# Patient Record
Sex: FEMALE | Race: WHITE | NOT HISPANIC OR LATINO | Employment: UNEMPLOYED | URBAN - METROPOLITAN AREA
[De-identification: names, ages, dates, MRNs, and addresses within clinical notes are randomized per-mention and may not be internally consistent; named-entity substitution may affect disease eponyms.]

---

## 2017-03-06 ENCOUNTER — PROCEDURE VISIT (OUTPATIENT)
Dept: OBSTETRICS AND GYNECOLOGY | Facility: CLINIC | Age: 22
End: 2017-03-06

## 2017-03-06 ENCOUNTER — OFFICE VISIT (OUTPATIENT)
Dept: OBSTETRICS AND GYNECOLOGY | Facility: CLINIC | Age: 22
End: 2017-03-06

## 2017-03-06 VITALS
HEIGHT: 66 IN | BODY MASS INDEX: 17.36 KG/M2 | WEIGHT: 108 LBS | SYSTOLIC BLOOD PRESSURE: 100 MMHG | DIASTOLIC BLOOD PRESSURE: 62 MMHG

## 2017-03-06 DIAGNOSIS — Z36.9 ANTENATAL SCREENING ENCOUNTER: ICD-10-CM

## 2017-03-06 DIAGNOSIS — Z34.01 NORMAL FIRST PREGNANCY WITH UNCERTAIN DATE OF LMP, ANTEPARTUM, FIRST TRIMESTER: ICD-10-CM

## 2017-03-06 DIAGNOSIS — Z87.898 HISTORY OF SUBSTANCE USE: ICD-10-CM

## 2017-03-06 DIAGNOSIS — Z32.00 ENCOUNTER FOR CONFIRMATION OF PREGNANCY TEST RESULT WITH PHYSICAL EXAMINATION: Primary | ICD-10-CM

## 2017-03-06 DIAGNOSIS — Z13.9 SCREENING: ICD-10-CM

## 2017-03-06 LAB
BILIRUB BLD-MCNC: NEGATIVE MG/DL
CBC, PLATELET CT, AND DIFF: (no result)
CLARITY, POC: CLEAR
COLOR UR: YELLOW
GLUCOSE UR STRIP-MCNC: NEGATIVE MG/DL
KETONES UR QL: NEGATIVE
LEUKOCYTE EST, POC: NEGATIVE
NITRITE UR-MCNC: NEGATIVE MG/ML
PH UR: 6.5 [PH] (ref 5–8)
PROT UR STRIP-MCNC: NEGATIVE MG/DL
RBC # UR STRIP: NEGATIVE /UL
SP GR UR: 1.03 (ref 1–1.03)
UROBILINOGEN UR QL: NORMAL

## 2017-03-06 PROCEDURE — 76801 OB US < 14 WKS SINGLE FETUS: CPT | Performed by: OBSTETRICS & GYNECOLOGY

## 2017-03-06 PROCEDURE — 81002 URINALYSIS NONAUTO W/O SCOPE: CPT | Performed by: OBSTETRICS & GYNECOLOGY

## 2017-03-06 PROCEDURE — 99385 PREV VISIT NEW AGE 18-39: CPT | Performed by: OBSTETRICS & GYNECOLOGY

## 2017-03-06 RX ORDER — VITAMIN A ACETATE, .BETA.-CAROTENE, ASCORBIC ACID, CHOLECALCIFEROL, .ALPHA.-TOCOPHEROL ACETATE, DL-, THIAMINE MONONITRATE, RIBOFLAVIN, NIACINAMIDE, PYRIDOXINE HYDROCHLORIDE, FOLIC ACID, CYANOCOBALAMIN, CALCIUM CARBONATE, FERROUS FUMARATE, ZINC OXIDE, AND CUPRIC OXIDE 2000; 2000; 120; 400; 22; 1.84; 3; 20; 10; 1; 12; 200; 27; 25; 2 [IU]/1; [IU]/1; MG/1; [IU]/1; MG/1; MG/1; MG/1; MG/1; MG/1; MG/1; UG/1; MG/1; MG/1; MG/1; MG/1
TABLET ORAL
Refills: 0 | COMMUNITY
Start: 2017-02-14

## 2017-03-06 NOTE — PROGRESS NOTES
"Subjective   Annabella Padgett is a 21 y.o. female who presents as a new patient for confirmation of pregnancy  pt states thinks she has a yeast infection/states has a lot of vaginal discharge/was told she had a UTI when she went to the hospital/has since taken antibiotics and is feeling better- TVUS at [place] on on 2/14/17 revealed viable IUP at 8-1 weeks c/w LMP with VIRGIE of 9/21/17 - GC/CT negative at ER on 2/14/17 - desires informaseq  History of Present Illness    The following portions of the patient's history were reviewed and updated as appropriate: allergies, current medications, past family history, past medical history, past social history, past surgical history and problem list.    Review of Systems   Constitutional: Negative for chills, fatigue and fever.   Gastrointestinal: Negative for abdominal distention and abdominal pain.   Genitourinary: Negative for difficulty urinating, dysuria, menstrual problem, pelvic pain, vaginal bleeding, vaginal discharge and vaginal pain.   All other systems reviewed and are negative.    Visit Vitals   • /62   • Ht 66\" (167.6 cm)   • Wt 108 lb (49 kg)   • LMP 12/15/2016   • Breastfeeding No   • BMI 17.43 kg/m2         Objective   Physical Exam   Constitutional: She is oriented to person, place, and time. She appears well-developed and well-nourished.   Neck: Normal range of motion. Neck supple. No thyromegaly present.   Cardiovascular: Normal rate and regular rhythm.    Pulmonary/Chest: Effort normal and breath sounds normal.   Abdominal: Soft. Bowel sounds are normal. She exhibits no distension. There is no tenderness.   Genitourinary: Pelvic exam was performed with patient supine.   Musculoskeletal: Normal range of motion. She exhibits no edema.   Neurological: She is alert and oriented to person, place, and time.   Skin: Skin is warm and dry. No rash noted.   Psychiatric: She has a normal mood and affect. Her behavior is normal.   Nursing note and vitals " reviewed.    Fetal heart tones - 171    Assessment/Plan   Annabella was seen today for establish care and possible pregnancy.    Diagnoses and all orders for this visit:    Encounter for confirmation of pregnancy test result with physical examination  -     Varicella Zoster Antibody, IgG  -     Urine Culture  -     Rubella Antibody, IgG  -     RPR  -     HIV-1 / O / 2 Ag / Antibody 4th Generation  -     Hepatitis C Antibody  -     Hepatitis B Surface Antigen  -     Hgb. Frac. Profile  -     CBC & Differential  -     Antibody Screen  -     ABO / Rh    Screening  -     POC Urinalysis Dipstick  -     Cancel: POC Pregnancy, Urine     screening encounter  -     InformaSeq([facility]) With Y Analysis    History of substance use    3 weeks for ob intake

## 2017-03-10 ENCOUNTER — TELEPHONE (OUTPATIENT)
Dept: OBSTETRICS AND GYNECOLOGY | Facility: CLINIC | Age: 22
End: 2017-03-10

## 2017-03-12 LAB
# FETUSES US: 1
ABO GROUP BLD: NORMAL
BACTERIA UR CULT: NO GROWTH
BACTERIA UR CULT: NORMAL
BASOPHILS # BLD AUTO: 0 X10E3/UL (ref 0–0.2)
BASOPHILS NFR BLD AUTO: 0 %
BLD GP AB SCN SERPL QL: NEGATIVE
CFDNA.FET/CFDNA.TOTAL SFR FETUS: 13.1 %
CHR X + Y ANEUP PLAS.CFDNA: NORMAL
CHROM BREAK BLD DEB: NORMAL
CHROMOSOME 18 RESULT (REFERENCE): NORMAL
CYTOGENETICS STUDY: NORMAL
EOSINOPHIL # BLD AUTO: 0.4 X10E3/UL (ref 0–0.4)
EOSINOPHIL NFR BLD AUTO: 3 %
ERYTHROCYTE [DISTWIDTH] IN BLOOD BY AUTOMATED COUNT: 13.5 % (ref 12.3–15.4)
FET 13+18+21+X+Y ANEUP PLAS.CFDNA: NORMAL
FET CHR 13 TS PLAS.CFDNA QL: NORMAL
FET CHR 18 TS PLAS.CFDNA QL: NORMAL
FET CHR 21 TS PLAS.CFDNA QL: NORMAL
FET CHR 21 TS PLAS.CFDNA QL: NORMAL
GA: 11.6 WEEKS
GENETIC ALGORITHM SENSITIVITY: NORMAL %
HBV SURFACE AG SERPL QL IA: NEGATIVE
HCT VFR BLD AUTO: 38 % (ref 34–46.6)
HCV AB S/CO SERPL IA: <0.1 S/CO RATIO (ref 0–0.9)
HGB A MFR BLD: 97.3 % (ref 94–98)
HGB A2 MFR BLD COLUMN CHROM: 2.7 % (ref 0.7–3.1)
HGB BLD-MCNC: 12.9 G/DL (ref 11.1–15.9)
HGB C MFR BLD: 0 %
HGB F MFR BLD: 0 % (ref 0–2)
HGB FRACT BLD-IMP: NORMAL
HGB S BLD QL SOLY: NEGATIVE
HGB S MFR BLD: 0 %
HIV 1+2 AB+HIV1 P24 AG SERPL QL IA: NON REACTIVE
IMM GRANULOCYTES # BLD: 0 X10E3/UL (ref 0–0.1)
IMM GRANULOCYTES NFR BLD: 0 %
LAB DIRECTOR NAME PROVIDER: NORMAL
LYMPHOCYTES # BLD AUTO: 2.3 X10E3/UL (ref 0.7–3.1)
LYMPHOCYTES NFR BLD AUTO: 19 %
Lab: NORMAL
MCH RBC QN AUTO: 30.4 PG (ref 26.6–33)
MCHC RBC AUTO-ENTMCNC: 33.9 G/DL (ref 31.5–35.7)
MCV RBC AUTO: 90 FL (ref 79–97)
MONOCYTES # BLD AUTO: 0.6 X10E3/UL (ref 0.1–0.9)
MONOCYTES NFR BLD AUTO: 5 %
NEUTROPHILS # BLD AUTO: 8.7 X10E3/UL (ref 1.4–7)
NEUTROPHILS NFR BLD AUTO: 73 %
PLATELET # BLD AUTO: 328 X10E3/UL (ref 150–379)
RBC # BLD AUTO: 4.24 X10E6/UL (ref 3.77–5.28)
REASON FOR REFERRAL (NARRATIVE): NORMAL
REF LAB TEST METHOD: NORMAL
REFERENCE: NORMAL
RH BLD: POSITIVE
RPR SER QL: NON REACTIVE
RUBV IGG SERPL IA-ACNC: 3.41 INDEX
SERVICE CMNT 02-IMP: NORMAL
SERVICE CMNT-IMP: NORMAL
SEX CHROMOSOME INTERPRETATION: NORMAL
VZV IGG SER IA-ACNC: 1415 INDEX
WBC # BLD AUTO: 12.1 X10E3/UL (ref 3.4–10.8)

## 2017-03-13 ENCOUNTER — TELEPHONE (OUTPATIENT)
Dept: OBSTETRICS AND GYNECOLOGY | Facility: CLINIC | Age: 22
End: 2017-03-13

## 2017-03-13 LAB
AMPHETAMINES UR QL SCN: NEGATIVE NG/ML
BARBITURATES UR QL SCN: NEGATIVE NG/ML
BENZODIAZ UR QL: POSITIVE
BZE UR QL: NEGATIVE NG/ML
CANNABINOIDS UR QL SCN: POSITIVE
METHADONE UR QL SCN: NEGATIVE NG/ML
OPIATES UR QL: NEGATIVE NG/ML
PCP UR QL: NEGATIVE NG/ML
PROPOXYPH UR QL: NEGATIVE NG/ML

## 2017-03-13 NOTE — TELEPHONE ENCOUNTER
----- Message from Caridad Hedrick MD sent at 3/13/2017  8:57 AM EDT -----  Please call patient and notify of normal results of prenatal labs and testing for aneuploidy negative and it's a boy!!

## 2017-03-13 NOTE — PROGRESS NOTES
Please call patient and notify of normal results of prenatal labs and testing for aneuploidy negative and it's a boy!!

## 2017-03-14 NOTE — TELEPHONE ENCOUNTER
Called and sw man who said that he would let pt know I called and would have her call me back when he got home. I stated understanding.

## 2017-03-21 ENCOUNTER — TELEPHONE (OUTPATIENT)
Dept: OBSTETRICS AND GYNECOLOGY | Facility: CLINIC | Age: 22
End: 2017-03-21

## 2017-03-21 RX ORDER — FLUCONAZOLE 150 MG/1
150 TABLET ORAL DAILY
Qty: 1 TABLET | Refills: 0 | Status: SHIPPED | OUTPATIENT
Start: 2017-03-21 | End: 2017-05-10

## 2017-03-21 NOTE — TELEPHONE ENCOUNTER
----- Message from Shefali Hayes sent at 3/21/2017 12:32 PM EDT -----  Contact: pt  Pt called asking for an RX for Monistat to be called in for her because passport will pay for it. PROMISEM

## 2017-03-22 NOTE — TELEPHONE ENCOUNTER
Called number listed and sw man who gave me another number to call pt at. Called this number ([phone]) and it continued to ring and I could not leave and message.

## 2017-03-24 NOTE — TELEPHONE ENCOUNTER
Called number given to me on 3/22/17 and it still continued to ring and I could not leave a message.

## 2017-04-07 ENCOUNTER — INITIAL PRENATAL (OUTPATIENT)
Dept: OBSTETRICS AND GYNECOLOGY | Facility: CLINIC | Age: 22
End: 2017-04-07

## 2017-04-07 VITALS — BODY MASS INDEX: 18.56 KG/M2 | WEIGHT: 115 LBS | DIASTOLIC BLOOD PRESSURE: 60 MMHG | SYSTOLIC BLOOD PRESSURE: 102 MMHG

## 2017-04-07 DIAGNOSIS — F13.10 BENZODIAZEPINE ABUSE (HCC): ICD-10-CM

## 2017-04-07 DIAGNOSIS — Z34.82 PRENATAL CARE, SUBSEQUENT PREGNANCY, SECOND TRIMESTER: Primary | ICD-10-CM

## 2017-04-07 DIAGNOSIS — Z13.9 SCREENING: ICD-10-CM

## 2017-04-07 DIAGNOSIS — F12.90 MARIJUANA USE: ICD-10-CM

## 2017-04-07 DIAGNOSIS — Z87.898 HISTORY OF SUBSTANCE USE: ICD-10-CM

## 2017-04-07 DIAGNOSIS — O99.332 TOBACCO USE COMPLICATING PREGNANCY, SECOND TRIMESTER: ICD-10-CM

## 2017-04-07 PROBLEM — O99.330 TOBACCO USE COMPLICATING PREGNANCY: Status: ACTIVE | Noted: 2017-04-07

## 2017-04-07 LAB
BILIRUB BLD-MCNC: ABNORMAL MG/DL
CLARITY, POC: CLEAR
COLOR UR: YELLOW
GLUCOSE UR STRIP-MCNC: NEGATIVE MG/DL
KETONES UR QL: ABNORMAL
LEUKOCYTE EST, POC: NEGATIVE
NITRITE UR-MCNC: NEGATIVE MG/ML
PH UR: 6 [PH] (ref 5–8)
PROT UR STRIP-MCNC: ABNORMAL MG/DL
RBC # UR STRIP: NEGATIVE /UL
SP GR UR: 1.03 (ref 1–1.03)
UROBILINOGEN UR QL: NORMAL

## 2017-04-07 PROCEDURE — 99213 OFFICE O/P EST LOW 20 MIN: CPT | Performed by: OBSTETRICS & GYNECOLOGY

## 2017-04-07 PROCEDURE — 99406 BEHAV CHNG SMOKING 3-10 MIN: CPT | Performed by: OBSTETRICS & GYNECOLOGY

## 2017-04-07 PROCEDURE — 81002 URINALYSIS NONAUTO W/O SCOPE: CPT | Performed by: OBSTETRICS & GYNECOLOGY

## 2017-04-07 NOTE — PROGRESS NOTES
HPI: 21 y.o.  at 16w1d presents for ob intake.  No c/o    [x]  Vitals reviewed    ROS:  GI:  Negative  Pulmonary: Negative     A/P  1. Intrauterine pregnancy at 16w1d     Assessment/Plan   Patient Active Problem List   Diagnosis Code   • History of substance use Z87.898   • Benzodiazepine abuse F13.10   • Marijuana use F12.10       PLAN:   Benzo/MJ use- recheck throughout prenatal care - encourage cessation   OB intake done   Prenatal labs reviewed   Informaseq negative   Declines  AFP only   4 weeks anatomy US   Tobacco use - I advised the patient of the risks in continuing to use tobacco, and I provided this patient with smoking cessation educational materials.  I also discussed how to quit smoking and the patient has expressed the willingness to quit.      During this visit, I spent 3-10 mintues counseling the patient regarding smoking cessation.

## 2017-05-10 ENCOUNTER — PROCEDURE VISIT (OUTPATIENT)
Dept: OBSTETRICS AND GYNECOLOGY | Facility: CLINIC | Age: 22
End: 2017-05-10

## 2017-05-10 ENCOUNTER — ROUTINE PRENATAL (OUTPATIENT)
Dept: OBSTETRICS AND GYNECOLOGY | Facility: CLINIC | Age: 22
End: 2017-05-10

## 2017-05-10 VITALS — BODY MASS INDEX: 18.56 KG/M2 | WEIGHT: 115 LBS | DIASTOLIC BLOOD PRESSURE: 68 MMHG | SYSTOLIC BLOOD PRESSURE: 110 MMHG

## 2017-05-10 DIAGNOSIS — IMO0002 EVALUATE ANATOMY NOT SEEN ON PRIOR SONOGRAM: ICD-10-CM

## 2017-05-10 DIAGNOSIS — Z34.82 PRENATAL CARE, SUBSEQUENT PREGNANCY, SECOND TRIMESTER: Primary | ICD-10-CM

## 2017-05-10 DIAGNOSIS — O99.332 TOBACCO USE COMPLICATING PREGNANCY, SECOND TRIMESTER: ICD-10-CM

## 2017-05-10 DIAGNOSIS — F19.11 HISTORY OF SUBSTANCE ABUSE (HCC): ICD-10-CM

## 2017-05-10 DIAGNOSIS — Z87.898 HISTORY OF SUBSTANCE USE: ICD-10-CM

## 2017-05-10 DIAGNOSIS — Z36.89 ENCOUNTER FOR FETAL ANATOMIC SURVEY: Primary | ICD-10-CM

## 2017-05-10 DIAGNOSIS — Z13.9 SCREENING: ICD-10-CM

## 2017-05-10 LAB
BILIRUB BLD-MCNC: NEGATIVE MG/DL
CLARITY, POC: CLEAR
COLOR UR: YELLOW
GLUCOSE UR STRIP-MCNC: NEGATIVE MG/DL
KETONES UR QL: NEGATIVE
LEUKOCYTE EST, POC: NEGATIVE
NITRITE UR-MCNC: NEGATIVE MG/ML
PH UR: 7.5 [PH] (ref 5–8)
PROT UR STRIP-MCNC: ABNORMAL MG/DL
RBC # UR STRIP: NEGATIVE /UL
SP GR UR: 1.02 (ref 1–1.03)
UROBILINOGEN UR QL: NORMAL

## 2017-05-10 PROCEDURE — 99213 OFFICE O/P EST LOW 20 MIN: CPT | Performed by: OBSTETRICS & GYNECOLOGY

## 2017-05-10 PROCEDURE — 81002 URINALYSIS NONAUTO W/O SCOPE: CPT | Performed by: OBSTETRICS & GYNECOLOGY

## 2017-05-10 PROCEDURE — 76805 OB US >/= 14 WKS SNGL FETUS: CPT | Performed by: OBSTETRICS & GYNECOLOGY

## 2017-05-13 LAB
A VAGINAE DNA VAG QL NAA+PROBE: NORMAL SCORE
BVAB2 DNA VAG QL NAA+PROBE: NORMAL SCORE
C ALBICANS DNA VAG QL NAA+PROBE: NEGATIVE
C GLABRATA DNA VAG QL NAA+PROBE: NEGATIVE
C TRACH RRNA SPEC QL NAA+PROBE: NEGATIVE
MEGA1 DNA VAG QL NAA+PROBE: NORMAL SCORE
N GONORRHOEA RRNA SPEC QL NAA+PROBE: NEGATIVE
T VAGINALIS RRNA SPEC QL NAA+PROBE: NEGATIVE

## 2017-05-15 ENCOUNTER — TELEPHONE (OUTPATIENT)
Dept: OBSTETRICS AND GYNECOLOGY | Facility: CLINIC | Age: 22
End: 2017-05-15

## 2017-05-17 LAB
AMPHETAMINES UR QL SCN: NEGATIVE NG/ML
BARBITURATES UR QL SCN: NEGATIVE NG/ML
BENZODIAZ UR QL: NEGATIVE
BZE UR QL: NEGATIVE NG/ML
CANNABINOIDS UR QL SCN: POSITIVE
METHADONE UR QL SCN: NEGATIVE NG/ML
OPIATES UR QL: NEGATIVE NG/ML
PCP UR QL: NEGATIVE NG/ML
PROPOXYPH UR QL: NEGATIVE NG/ML

## 2017-06-06 ENCOUNTER — ROUTINE PRENATAL (OUTPATIENT)
Dept: OBSTETRICS AND GYNECOLOGY | Facility: CLINIC | Age: 22
End: 2017-06-06

## 2017-06-06 ENCOUNTER — PROCEDURE VISIT (OUTPATIENT)
Dept: OBSTETRICS AND GYNECOLOGY | Facility: CLINIC | Age: 22
End: 2017-06-06

## 2017-06-06 VITALS — WEIGHT: 119 LBS | DIASTOLIC BLOOD PRESSURE: 60 MMHG | SYSTOLIC BLOOD PRESSURE: 100 MMHG | BODY MASS INDEX: 19.21 KG/M2

## 2017-06-06 DIAGNOSIS — Z87.898 HISTORY OF SUBSTANCE USE: ICD-10-CM

## 2017-06-06 DIAGNOSIS — IMO0002 EVALUATE ANATOMY NOT SEEN ON PRIOR SONOGRAM: ICD-10-CM

## 2017-06-06 DIAGNOSIS — Z13.9 SCREENING: ICD-10-CM

## 2017-06-06 DIAGNOSIS — O99.332 TOBACCO USE COMPLICATING PREGNANCY, SECOND TRIMESTER: ICD-10-CM

## 2017-06-06 DIAGNOSIS — IMO0002 EVALUATE ANATOMY NOT SEEN ON PRIOR SONOGRAM: Primary | ICD-10-CM

## 2017-06-06 DIAGNOSIS — F13.10 BENZODIAZEPINE ABUSE (HCC): ICD-10-CM

## 2017-06-06 DIAGNOSIS — O09.92 SUPERVISION OF HIGH RISK PREGNANCY, ANTEPARTUM, SECOND TRIMESTER: Primary | ICD-10-CM

## 2017-06-06 PROBLEM — O09.90 SUPERVISION OF HIGH RISK PREGNANCY, ANTEPARTUM: Status: ACTIVE | Noted: 2017-06-06

## 2017-06-06 LAB
BILIRUB BLD-MCNC: NEGATIVE MG/DL
CLARITY, POC: CLEAR
COLOR UR: YELLOW
GLUCOSE UR STRIP-MCNC: NEGATIVE MG/DL
KETONES UR QL: NEGATIVE
LEUKOCYTE EST, POC: NEGATIVE
NITRITE UR-MCNC: NEGATIVE MG/ML
PH UR: 6.5 [PH] (ref 5–8)
PROT UR STRIP-MCNC: NEGATIVE MG/DL
RBC # UR STRIP: NEGATIVE /UL
SP GR UR: 1.03 (ref 1–1.03)
UROBILINOGEN UR QL: NORMAL

## 2017-06-06 PROCEDURE — 99213 OFFICE O/P EST LOW 20 MIN: CPT | Performed by: OBSTETRICS & GYNECOLOGY

## 2017-06-06 PROCEDURE — 81002 URINALYSIS NONAUTO W/O SCOPE: CPT | Performed by: OBSTETRICS & GYNECOLOGY

## 2017-06-06 PROCEDURE — 76816 OB US FOLLOW-UP PER FETUS: CPT | Performed by: OBSTETRICS & GYNECOLOGY

## 2017-06-06 NOTE — PROGRESS NOTES
CC/NO C/O/SC      HPI: 22 y.o.  at 24w5d  feeling well without any complaints    [x]  Vitals reviewed    ROS:  GI:  Negative  Pulmonary: Negative     A/P  1.  Intrauterine pregnancy at 24w5d   2.  Tobacco use affecting pregnancy discussed cessation  3.  History of drug abuse-urine tox today, reports abstinence  4.  Incomplete anatomic survey-ultrasound scheduled for today      Assessment/Plan   Patient Active Problem List   Diagnosis Code   • History of substance use Z87.898   • Benzodiazepine abuse F13.10   • Marijuana use F12.10   • Tobacco use complicating pregnancy O99.330   • Evaluate anatomy not seen on prior sonogram Z36   • Supervision of high risk pregnancy, antepartum O09.90       PLAN:     Torrey Strong MD  2017 12:22 PM

## 2017-06-07 ENCOUNTER — TELEPHONE (OUTPATIENT)
Dept: OBSTETRICS AND GYNECOLOGY | Facility: CLINIC | Age: 22
End: 2017-06-07

## 2017-06-07 DIAGNOSIS — O36.5920 POOR FETAL GROWTH AFFECTING MANAGEMENT OF MOTHER IN SECOND TRIMESTER, NOT APPLICABLE OR UNSPECIFIED FETUS: Primary | ICD-10-CM

## 2017-06-07 PROBLEM — IMO0002 EVALUATE ANATOMY NOT SEEN ON PRIOR SONOGRAM: Status: RESOLVED | Noted: 2017-05-10 | Resolved: 2017-06-07

## 2017-06-07 NOTE — TELEPHONE ENCOUNTER
Sayra  - please contact patient and tell her fetus measured small yesterday on US and we are scheduling her for US and consult next week with LETICIA - thanks!

## 2017-06-07 NOTE — TELEPHONE ENCOUNTER
Pt notified of ultrasound results and was given info for appt on 06/16/17 at 300 at the [facility]. Pt voiced understanding.

## 2017-06-16 ENCOUNTER — HOSPITAL ENCOUNTER (OUTPATIENT)
Dept: ULTRASOUND IMAGING | Facility: HOSPITAL | Age: 22
Discharge: HOME | End: 2017-06-16
Attending: OBSTETRICS & GYNECOLOGY | Admitting: OBSTETRICS & GYNECOLOGY

## 2017-06-16 ENCOUNTER — TRANSCRIBE ORDERS (OUTPATIENT)
Dept: ADMINISTRATIVE | Facility: HOSPITAL | Age: 22
End: 2017-06-16

## 2017-06-16 DIAGNOSIS — O36.5920 IUGR (INTRAUTERINE GROWTH RESTRICTION) AFFECTING CARE OF MOTHER, SECOND TRIMESTER, NOT APPLICABLE OR UNSPECIFIED FETUS: Primary | ICD-10-CM

## 2017-06-16 DIAGNOSIS — O36.5920 POOR FETAL GROWTH AFFECTING MANAGEMENT OF MOTHER IN SECOND TRIMESTER, NOT APPLICABLE OR UNSPECIFIED FETUS: ICD-10-CM

## 2017-06-16 LAB
AMPHETAMINES UR QL SCN: NEGATIVE NG/ML
BARBITURATES UR QL SCN: NEGATIVE NG/ML
BENZODIAZ UR QL: NEGATIVE NG/ML
BZE UR QL: NEGATIVE NG/ML
CANNABINOIDS UR QL SCN: POSITIVE
METHADONE UR QL SCN: NEGATIVE NG/ML
OPIATES UR QL: NEGATIVE NG/ML
PCP UR QL: NEGATIVE NG/ML
PROPOXYPH UR QL: NEGATIVE NG/ML

## 2017-06-16 PROCEDURE — 76821 MIDDLE CEREBRAL ARTERY ECHO: CPT

## 2017-06-16 PROCEDURE — 76811 OB US DETAILED SNGL FETUS: CPT

## 2017-06-16 PROCEDURE — 76820 UMBILICAL ARTERY ECHO: CPT

## 2017-06-16 NOTE — CONSULTS
Ms. Padgett is referred by Dr. Strong for MFM consultation on indication of poor fetal growth.    22 G [id number] at 26 1/7 VIRGIE 9/21/17  L = 11    Prenatal care is significant for:    Declined cf carrier screen   Declined MSAFP  cfDNA neg  Hx benzo, MJ      PMH  Ob  G1 7/16/14  39 4/7   5-12   G2 Current    Ills  None denies HTN DM asthm    Alls:  NKMA    Ops   None    Meds  PNV  Occ Tylenol for HA      Sh  + cigarettes: states she quit two days ago  Employed at [employer]       Neg for T21 CHD ONTD CF  FOB has two uncles with MR, both in their 50s, both require supportive care    Pt states she is eating 3 meals per day      VS  Wt 119#  (108# on 3/6/17)  BMI = 19  Prepregnancy weight    See US report:   g  3rd centile  AC 19.1       1.5 centile    A:  26 1/7 VIRGIE 9/21/17  Fetal growth restriction: EFW at 3rd centile, AC at 1.5 centile  Normal Doppler studies  Poor maternal weight gain  Prior smoker, quit 6/16/17  Urine + THC    P.  Recommend serum ferritin  Evaluate growth and MCA, UA Dopplers every other week.  Initiate weekly BPP at 28-30 weeks pending growth, Doppler waveforms.   Recommend TDaP, pneumovax    For billing purposes, duration of face to face consultation was approximately 30 minutes of which > 50% was devoted to patient counseling and coordination of care, exclusive of US exam.

## 2017-06-30 ENCOUNTER — HOSPITAL ENCOUNTER (OUTPATIENT)
Dept: ULTRASOUND IMAGING | Facility: HOSPITAL | Age: 22
Discharge: HOME | End: 2017-06-30
Attending: OBSTETRICS & GYNECOLOGY | Admitting: OBSTETRICS & GYNECOLOGY

## 2017-06-30 DIAGNOSIS — O36.5920 IUGR (INTRAUTERINE GROWTH RESTRICTION) AFFECTING CARE OF MOTHER, SECOND TRIMESTER, NOT APPLICABLE OR UNSPECIFIED FETUS: ICD-10-CM

## 2017-06-30 PROCEDURE — 76820 UMBILICAL ARTERY ECHO: CPT

## 2017-06-30 PROCEDURE — 76821 MIDDLE CEREBRAL ARTERY ECHO: CPT

## 2017-06-30 PROCEDURE — 76816 OB US FOLLOW-UP PER FETUS: CPT

## 2017-07-14 ENCOUNTER — TRANSCRIBE ORDERS (OUTPATIENT)
Dept: ADMINISTRATIVE | Facility: HOSPITAL | Age: 22
End: 2017-07-14

## 2017-07-14 ENCOUNTER — HOSPITAL ENCOUNTER (OUTPATIENT)
Dept: ULTRASOUND IMAGING | Facility: HOSPITAL | Age: 22
Discharge: HOME | End: 2017-07-14
Attending: OBSTETRICS & GYNECOLOGY | Admitting: OBSTETRICS & GYNECOLOGY

## 2017-07-14 ENCOUNTER — ROUTINE PRENATAL (OUTPATIENT)
Dept: OBSTETRICS AND GYNECOLOGY | Facility: CLINIC | Age: 22
End: 2017-07-14

## 2017-07-14 VITALS — WEIGHT: 128 LBS | DIASTOLIC BLOOD PRESSURE: 56 MMHG | BODY MASS INDEX: 20.66 KG/M2 | SYSTOLIC BLOOD PRESSURE: 100 MMHG

## 2017-07-14 DIAGNOSIS — F13.10 BENZODIAZEPINE ABUSE (HCC): ICD-10-CM

## 2017-07-14 DIAGNOSIS — O36.5931 IUGR (INTRAUTERINE GROWTH RESTRICTION) AFFECTING CARE OF MOTHER, THIRD TRIMESTER, FETUS 1: Primary | ICD-10-CM

## 2017-07-14 DIAGNOSIS — Z13.9 SCREENING: ICD-10-CM

## 2017-07-14 DIAGNOSIS — F12.90 MARIJUANA USE: ICD-10-CM

## 2017-07-14 DIAGNOSIS — O36.5920 IUGR (INTRAUTERINE GROWTH RESTRICTION) AFFECTING CARE OF MOTHER, SECOND TRIMESTER, NOT APPLICABLE OR UNSPECIFIED FETUS: ICD-10-CM

## 2017-07-14 DIAGNOSIS — O36.5920 POOR FETAL GROWTH AFFECTING MANAGEMENT OF MOTHER IN SECOND TRIMESTER, NOT APPLICABLE OR UNSPECIFIED FETUS: ICD-10-CM

## 2017-07-14 DIAGNOSIS — Z87.898 HISTORY OF SUBSTANCE USE: ICD-10-CM

## 2017-07-14 DIAGNOSIS — Z23 NEED FOR VACCINATION: ICD-10-CM

## 2017-07-14 DIAGNOSIS — O09.93 SUPERVISION OF HIGH RISK PREGNANCY, ANTEPARTUM, THIRD TRIMESTER: Primary | ICD-10-CM

## 2017-07-14 DIAGNOSIS — F12.10 MARIJUANA ABUSE: ICD-10-CM

## 2017-07-14 LAB
BILIRUB BLD-MCNC: NEGATIVE MG/DL
CLARITY, POC: CLEAR
COLOR UR: YELLOW
GLUCOSE UR STRIP-MCNC: NEGATIVE MG/DL
KETONES UR QL: ABNORMAL
LEUKOCYTE EST, POC: NEGATIVE
NITRITE UR-MCNC: NEGATIVE MG/ML
PH UR: 7 [PH] (ref 5–8)
PROT UR STRIP-MCNC: NEGATIVE MG/DL
RBC # UR STRIP: NEGATIVE /UL
SP GR UR: 1.01 (ref 1–1.03)
UROBILINOGEN UR QL: NORMAL

## 2017-07-14 PROCEDURE — 76821 MIDDLE CEREBRAL ARTERY ECHO: CPT

## 2017-07-14 PROCEDURE — 90471 IMMUNIZATION ADMIN: CPT | Performed by: OBSTETRICS & GYNECOLOGY

## 2017-07-14 PROCEDURE — 76819 FETAL BIOPHYS PROFIL W/O NST: CPT

## 2017-07-14 PROCEDURE — 76820 UMBILICAL ARTERY ECHO: CPT

## 2017-07-14 PROCEDURE — 90715 TDAP VACCINE 7 YRS/> IM: CPT | Performed by: OBSTETRICS & GYNECOLOGY

## 2017-07-14 PROCEDURE — 0502F SUBSEQUENT PRENATAL CARE: CPT | Performed by: OBSTETRICS & GYNECOLOGY

## 2017-07-14 PROCEDURE — 76816 OB US FOLLOW-UP PER FETUS: CPT

## 2017-07-14 PROCEDURE — 81002 URINALYSIS NONAUTO W/O SCOPE: CPT | Performed by: OBSTETRICS & GYNECOLOGY

## 2017-07-17 ENCOUNTER — LAB (OUTPATIENT)
Dept: OBSTETRICS AND GYNECOLOGY | Facility: CLINIC | Age: 22
End: 2017-07-17

## 2017-07-17 DIAGNOSIS — Z13.1 SCREENING FOR DIABETES MELLITUS: Primary | ICD-10-CM

## 2017-07-17 DIAGNOSIS — Z13.0 SCREENING FOR IRON DEFICIENCY ANEMIA: ICD-10-CM

## 2017-07-17 LAB
EXTERNAL GTT 1 HOUR: 84
GLUCOSE 1H P 50 G GLC PO SERPL-MCNC: 84 MG/DL (ref 65–139)
HCT VFR BLD AUTO: 31.8 % (ref 35.6–45.5)
HGB BLD-MCNC: 10.4 G/DL (ref 11.9–15.5)

## 2017-07-18 ENCOUNTER — TELEPHONE (OUTPATIENT)
Dept: OBSTETRICS AND GYNECOLOGY | Facility: CLINIC | Age: 22
End: 2017-07-18

## 2017-07-18 NOTE — TELEPHONE ENCOUNTER
----- Message from Torrey Strong MD sent at 7/17/2017 10:09 PM EDT -----  Call pt:  1 hour GTT is Negative  Mild anemia:  Rx FeSO4 bid

## 2017-07-20 ENCOUNTER — TELEPHONE (OUTPATIENT)
Dept: OBSTETRICS AND GYNECOLOGY | Facility: CLINIC | Age: 22
End: 2017-07-20

## 2017-07-21 RX ORDER — FERROUS SULFATE 325(65) MG
325 TABLET ORAL 2 TIMES DAILY
Qty: 60 TABLET | Refills: 6 | Status: SHIPPED | OUTPATIENT
Start: 2017-07-21

## 2017-07-21 RX ORDER — DOCUSATE SODIUM 100 MG/1
100 CAPSULE, LIQUID FILLED ORAL 2 TIMES DAILY
Qty: 60 CAPSULE | Refills: 1 | Status: SHIPPED | OUTPATIENT
Start: 2017-07-21 | End: 2017-09-06 | Stop reason: HOSPADM

## 2017-07-28 ENCOUNTER — HOSPITAL ENCOUNTER (OUTPATIENT)
Dept: ULTRASOUND IMAGING | Facility: HOSPITAL | Age: 22
Discharge: HOME | End: 2017-07-28
Attending: OBSTETRICS & GYNECOLOGY | Admitting: OBSTETRICS & GYNECOLOGY

## 2017-07-28 DIAGNOSIS — O36.5920 IUGR (INTRAUTERINE GROWTH RESTRICTION) AFFECTING CARE OF MOTHER, SECOND TRIMESTER, NOT APPLICABLE OR UNSPECIFIED FETUS: ICD-10-CM

## 2017-07-28 PROCEDURE — 76821 MIDDLE CEREBRAL ARTERY ECHO: CPT

## 2017-07-28 PROCEDURE — 76816 OB US FOLLOW-UP PER FETUS: CPT

## 2017-07-28 PROCEDURE — 76819 FETAL BIOPHYS PROFIL W/O NST: CPT

## 2017-07-28 PROCEDURE — 76820 UMBILICAL ARTERY ECHO: CPT

## 2017-07-31 ENCOUNTER — ROUTINE PRENATAL (OUTPATIENT)
Dept: OBSTETRICS AND GYNECOLOGY | Facility: CLINIC | Age: 22
End: 2017-07-31

## 2017-07-31 VITALS — DIASTOLIC BLOOD PRESSURE: 60 MMHG | SYSTOLIC BLOOD PRESSURE: 110 MMHG | WEIGHT: 128 LBS | BODY MASS INDEX: 20.66 KG/M2

## 2017-07-31 DIAGNOSIS — O99.333 TOBACCO USE COMPLICATING PREGNANCY, THIRD TRIMESTER: ICD-10-CM

## 2017-07-31 DIAGNOSIS — O36.5920 INTRAUTERINE GROWTH RESTRICTION (IUGR) AFFECTING CARE OF MOTHER, SECOND TRIMESTER, SINGLE GESTATION: ICD-10-CM

## 2017-07-31 DIAGNOSIS — O36.5931 IUGR (INTRAUTERINE GROWTH RESTRICTION) AFFECTING CARE OF MOTHER, THIRD TRIMESTER, FETUS 1: ICD-10-CM

## 2017-07-31 DIAGNOSIS — F13.10 BENZODIAZEPINE ABUSE (HCC): ICD-10-CM

## 2017-07-31 DIAGNOSIS — Z13.9 SCREENING: ICD-10-CM

## 2017-07-31 DIAGNOSIS — O09.93 SUPERVISION OF HIGH RISK PREGNANCY, ANTEPARTUM, THIRD TRIMESTER: Primary | ICD-10-CM

## 2017-07-31 DIAGNOSIS — F12.90 MARIJUANA USE: ICD-10-CM

## 2017-07-31 DIAGNOSIS — Z87.898 HISTORY OF SUBSTANCE USE: ICD-10-CM

## 2017-07-31 LAB
BILIRUB BLD-MCNC: NEGATIVE MG/DL
CLARITY, POC: CLEAR
COLOR UR: YELLOW
GLUCOSE UR STRIP-MCNC: NEGATIVE MG/DL
KETONES UR QL: NEGATIVE
LEUKOCYTE EST, POC: ABNORMAL
NITRITE UR-MCNC: NEGATIVE MG/ML
PH UR: 7.5 [PH] (ref 5–8)
PROT UR STRIP-MCNC: NEGATIVE MG/DL
RBC # UR STRIP: NEGATIVE /UL
SP GR UR: 1.02 (ref 1–1.03)
UROBILINOGEN UR QL: NORMAL

## 2017-07-31 PROCEDURE — 0502F SUBSEQUENT PRENATAL CARE: CPT | Performed by: OBSTETRICS & GYNECOLOGY

## 2017-07-31 PROCEDURE — 81002 URINALYSIS NONAUTO W/O SCOPE: CPT | Performed by: OBSTETRICS & GYNECOLOGY

## 2017-07-31 NOTE — PROGRESS NOTES
CC/ NO C/O SC    Patient presents for OB check feeling well without complaints.  She reports the fetus is active and moving.    Ultrasound in the reproductive testing Center shows severe IUGR with EFW at the 1st percentile and Ab circ the same.  Biophysical profile was reassuring and Dopplers were within normal limits.  Patient reports smoking cessation several weeks back.  The patient is being seen weekly in the reproductive testing Center.  Delivery is recommended between 34 and 36 weeks depending on growth.  Interval growth on this last scan showed less than expected increase and Ab circ.    Discussed twice weekly  testing with the patient.  With the fixed appointments on Friday in the reproductive testing Center will try to facilitate early week testing for even spacing

## 2017-08-04 ENCOUNTER — HOSPITAL ENCOUNTER (OUTPATIENT)
Dept: ULTRASOUND IMAGING | Facility: HOSPITAL | Age: 22
Discharge: HOME | End: 2017-08-04
Attending: OBSTETRICS & GYNECOLOGY | Admitting: OBSTETRICS & GYNECOLOGY

## 2017-08-04 DIAGNOSIS — O36.5931 IUGR (INTRAUTERINE GROWTH RESTRICTION) AFFECTING CARE OF MOTHER, THIRD TRIMESTER, FETUS 1: ICD-10-CM

## 2017-08-04 PROCEDURE — 76821 MIDDLE CEREBRAL ARTERY ECHO: CPT

## 2017-08-04 PROCEDURE — 76820 UMBILICAL ARTERY ECHO: CPT

## 2017-08-04 PROCEDURE — 76819 FETAL BIOPHYS PROFIL W/O NST: CPT

## 2017-08-08 ENCOUNTER — ROUTINE PRENATAL (OUTPATIENT)
Dept: OBSTETRICS AND GYNECOLOGY | Facility: CLINIC | Age: 22
End: 2017-08-08

## 2017-08-08 ENCOUNTER — HOSPITAL ENCOUNTER (OUTPATIENT)
Facility: HOSPITAL | Age: 22
Setting detail: OBSERVATION
Discharge: HOME | End: 2017-08-08
Attending: OBSTETRICS & GYNECOLOGY | Admitting: OBSTETRICS & GYNECOLOGY

## 2017-08-08 ENCOUNTER — PROCEDURE VISIT (OUTPATIENT)
Dept: OBSTETRICS AND GYNECOLOGY | Facility: CLINIC | Age: 22
End: 2017-08-08

## 2017-08-08 VITALS
SYSTOLIC BLOOD PRESSURE: 126 MMHG | BODY MASS INDEX: 21.66 KG/M2 | RESPIRATION RATE: 18 BRPM | HEIGHT: 65 IN | TEMPERATURE: 98.3 F | WEIGHT: 130 LBS | DIASTOLIC BLOOD PRESSURE: 61 MMHG | HEART RATE: 75 BPM

## 2017-08-08 VITALS — SYSTOLIC BLOOD PRESSURE: 110 MMHG | BODY MASS INDEX: 20.98 KG/M2 | WEIGHT: 130 LBS | DIASTOLIC BLOOD PRESSURE: 60 MMHG

## 2017-08-08 DIAGNOSIS — Z13.9 SCREENING: ICD-10-CM

## 2017-08-08 DIAGNOSIS — IMO0002 POOR FETAL GROWTH: Primary | ICD-10-CM

## 2017-08-08 DIAGNOSIS — F12.90 MARIJUANA USE: ICD-10-CM

## 2017-08-08 DIAGNOSIS — F13.10 BENZODIAZEPINE ABUSE (HCC): ICD-10-CM

## 2017-08-08 DIAGNOSIS — O09.93 SUPERVISION OF HIGH RISK PREGNANCY, ANTEPARTUM, THIRD TRIMESTER: Primary | ICD-10-CM

## 2017-08-08 DIAGNOSIS — O36.5920 INTRAUTERINE GROWTH RESTRICTION (IUGR) AFFECTING CARE OF MOTHER, SECOND TRIMESTER, SINGLE GESTATION: ICD-10-CM

## 2017-08-08 DIAGNOSIS — Z87.898 HISTORY OF SUBSTANCE USE: ICD-10-CM

## 2017-08-08 DIAGNOSIS — O99.333 TOBACCO USE COMPLICATING PREGNANCY, THIRD TRIMESTER: ICD-10-CM

## 2017-08-08 PROBLEM — Z34.90 PREGNANCY: Status: ACTIVE | Noted: 2017-08-08

## 2017-08-08 LAB
BILIRUB BLD-MCNC: NEGATIVE MG/DL
CLARITY, POC: CLEAR
COLOR UR: YELLOW
GLUCOSE UR STRIP-MCNC: NEGATIVE MG/DL
KETONES UR QL: NEGATIVE
LEUKOCYTE EST, POC: NEGATIVE
NITRITE UR-MCNC: NEGATIVE MG/ML
PH UR: 7 [PH] (ref 5–8)
PROT UR STRIP-MCNC: NEGATIVE MG/DL
RBC # UR STRIP: NEGATIVE /UL
SP GR UR: 1.02 (ref 1–1.03)
UROBILINOGEN UR QL: NORMAL

## 2017-08-08 PROCEDURE — 0502F SUBSEQUENT PRENATAL CARE: CPT | Performed by: OBSTETRICS & GYNECOLOGY

## 2017-08-08 PROCEDURE — 81002 URINALYSIS NONAUTO W/O SCOPE: CPT | Performed by: OBSTETRICS & GYNECOLOGY

## 2017-08-08 PROCEDURE — G0378 HOSPITAL OBSERVATION PER HR: HCPCS

## 2017-08-08 PROCEDURE — 59025 FETAL NON-STRESS TEST: CPT | Performed by: OBSTETRICS & GYNECOLOGY

## 2017-08-08 PROCEDURE — 59025 FETAL NON-STRESS TEST: CPT

## 2017-08-08 PROCEDURE — 76819 FETAL BIOPHYS PROFIL W/O NST: CPT | Performed by: OBSTETRICS & GYNECOLOGY

## 2017-08-08 RX ORDER — ACETAMINOPHEN 325 MG/1
650 TABLET ORAL EVERY 6 HOURS PRN
COMMUNITY
End: 2017-09-06 | Stop reason: HOSPADM

## 2017-08-08 NOTE — PROGRESS NOTES
[facility]  Ultrasound Note     2017    Patient:  Annabella Padgett      MR#:[id number]    22 y.o.   for biophysical profile     Patient Active Problem List   Diagnosis   • History of substance use   • Benzodiazepine abuse   • Marijuana use   • Tobacco use complicating pregnancy   • Supervision of high risk pregnancy, antepartum   • Intrauterine growth restriction (IUGR) affecting care of mother, second trimester, single gestation       [See the scanned report in the media tab for more details]    Impression    1.  Intrauterine pregnancy at 33w5d  2.  Reassuring BPP , ULICES: 11.2  3.  Fetus in cephalic position    Relevant comparison data available:  [x]  None    Referred to labor and delivery for nonstress test    Torrey Strong MD  2017 8:50 AM

## 2017-08-08 NOTE — NON STRESS TEST
Annabella Padgett, a  at 33w5d with an VIRGIE of 2017, Date entered prior to episode creation, was seen at [facility] LABOR DELIVERY for a nonstress test.    Chief Complaint   Patient presents with   • Non-stress Test     BPP  today       Interpretation A  Nonstress Test Interpretation A: Reactive (17 1130 : Tracey Clayton RN)

## 2017-08-08 NOTE — PROGRESS NOTES
Cc/ no c/o /sc      HPI: 22 y.o.  at 33w5d presents for routine OB visit reports feeling well without complaints.  She poor reports the fetus is regularly active    [x]  Vitals reviewed    ROS:  GI:  Negative  : active fetus  Pulmonary: Negative     A/P  1. Intrauterine pregnancy at 33w5d   2. Pregnancy Risk:  HIGH RISK    Assessment/Plan   Encounter Diagnoses   Name Primary?   • Screening    • Supervision of high risk pregnancy, antepartum, third trimester Yes   • Intrauterine growth restriction (IUGR) affecting care of mother, second trimester, single gestation    • Benzodiazepine abuse    • History of substance use    • Marijuana use    • Tobacco use complicating pregnancy, third trimester        PLAN:   Return in about 1 week (around 8/15/2017) for OB check and BPP.   Doppler studies in the reproductive testing Center this Friday  NST today for BPP       Torrey Strong MD  2017 8:58 AM

## 2017-08-11 ENCOUNTER — HOSPITAL ENCOUNTER (OUTPATIENT)
Dept: ULTRASOUND IMAGING | Facility: HOSPITAL | Age: 22
Discharge: HOME | End: 2017-08-11
Attending: OBSTETRICS & GYNECOLOGY | Admitting: OBSTETRICS & GYNECOLOGY

## 2017-08-11 DIAGNOSIS — O36.5931 IUGR (INTRAUTERINE GROWTH RESTRICTION) AFFECTING CARE OF MOTHER, THIRD TRIMESTER, FETUS 1: ICD-10-CM

## 2017-08-11 PROCEDURE — 76821 MIDDLE CEREBRAL ARTERY ECHO: CPT

## 2017-08-11 PROCEDURE — 76819 FETAL BIOPHYS PROFIL W/O NST: CPT

## 2017-08-11 PROCEDURE — 76816 OB US FOLLOW-UP PER FETUS: CPT

## 2017-08-11 PROCEDURE — 76820 UMBILICAL ARTERY ECHO: CPT

## 2017-08-11 NOTE — CONSULTS
[facility]  Maternal-Fetal Medicine Consult Note    Dear Dr. Hedrick:     I saw the above named patient for consultation upon your request due to poor fetal growth.     I appreciate you sending a copy of the patient's prenatal record which I reviewed.     The fetal EFW has increased to the 4th %tile.      A repeat growth sonogram is SCHEDULED in the [facility] in 2 weeks with BPP and Dopplers in 1 week.  However, the appointment can be canceled if you desire to make other arrangements.  BUT, please notify us if you make other arrangements.     PLEASE SEND AN ORDER AS CONFIRMATION OF AGREEMENT WITH REPEAT EXAMINATION.     I advised your patient to drink 2 cans of Boost per day.      Fetal Movement Monitoring instruction sheet describing the technique, rationale, and instructions was provided to your patient.     ACOG along with the CDC recommend Tdap vaccine after 20 weeks gestation during each pregnancy and a flu vaccine during the flu season to provide passive immunity to the .     For billing purposes, 15 min spent face-to-face with the patient of which > 50% devoted to patient counseling and coordination of care, exclusive of ultrasound exam.     If you have any questions about the results of this sonogram or my recommendations, please feel free to contact me at any time.     Thank you for referring this patient to the Reproductive Imaging Center at [facility].  If you have any questions about the results of this examination or my recommendations, please feel free to contact me at your convenience.     Sincerely yours,      Benjamin Parisi MD  2017  1:45 PM

## 2017-08-14 ENCOUNTER — ROUTINE PRENATAL (OUTPATIENT)
Dept: OBSTETRICS AND GYNECOLOGY | Facility: CLINIC | Age: 22
End: 2017-08-14

## 2017-08-14 ENCOUNTER — PROCEDURE VISIT (OUTPATIENT)
Dept: OBSTETRICS AND GYNECOLOGY | Facility: CLINIC | Age: 22
End: 2017-08-14

## 2017-08-14 VITALS — BODY MASS INDEX: 21.8 KG/M2 | SYSTOLIC BLOOD PRESSURE: 106 MMHG | WEIGHT: 131 LBS | DIASTOLIC BLOOD PRESSURE: 60 MMHG

## 2017-08-14 DIAGNOSIS — O36.5920 INTRAUTERINE GROWTH RESTRICTION (IUGR) AFFECTING CARE OF MOTHER, SECOND TRIMESTER, SINGLE GESTATION: ICD-10-CM

## 2017-08-14 DIAGNOSIS — O09.93 SUPERVISION OF HIGH RISK PREGNANCY, ANTEPARTUM, THIRD TRIMESTER: ICD-10-CM

## 2017-08-14 DIAGNOSIS — F19.11 HISTORY OF SUBSTANCE ABUSE (HCC): ICD-10-CM

## 2017-08-14 DIAGNOSIS — F12.90 MARIJUANA USE: ICD-10-CM

## 2017-08-14 DIAGNOSIS — IMO0002 POOR FETAL GROWTH: Primary | ICD-10-CM

## 2017-08-14 DIAGNOSIS — F13.10 BENZODIAZEPINE ABUSE (HCC): ICD-10-CM

## 2017-08-14 DIAGNOSIS — O99.333 TOBACCO USE COMPLICATING PREGNANCY, THIRD TRIMESTER: ICD-10-CM

## 2017-08-14 DIAGNOSIS — Z87.898 HISTORY OF SUBSTANCE USE: ICD-10-CM

## 2017-08-14 DIAGNOSIS — Z13.9 SCREENING: Primary | ICD-10-CM

## 2017-08-14 PROBLEM — Z34.90 PREGNANCY: Status: RESOLVED | Noted: 2017-08-08 | Resolved: 2017-08-14

## 2017-08-14 PROCEDURE — 76819 FETAL BIOPHYS PROFIL W/O NST: CPT | Performed by: OBSTETRICS & GYNECOLOGY

## 2017-08-14 PROCEDURE — 76816 OB US FOLLOW-UP PER FETUS: CPT | Performed by: OBSTETRICS & GYNECOLOGY

## 2017-08-14 PROCEDURE — 0502F SUBSEQUENT PRENATAL CARE: CPT | Performed by: OBSTETRICS & GYNECOLOGY

## 2017-08-14 PROCEDURE — 81002 URINALYSIS NONAUTO W/O SCOPE: CPT | Performed by: OBSTETRICS & GYNECOLOGY

## 2017-08-14 NOTE — PROGRESS NOTES
Cc/no c/o    Patient presents for routine OB visit feeling well without complaints.    Biophysical profile is reassuring 8/8, ULICES 10.5, normal fetal growth with EFW trending SGA  EFW 12%, Ab circ 22%    Patient followed in the [facility] every Friday for Dopplers

## 2017-08-14 NOTE — PROGRESS NOTES
[facility]  Ultrasound Note     2017    Patient:  Annabella Padgett      MR#:[id number]    22 y.o.      Patient Active Problem List   Diagnosis   • History of substance use   • Benzodiazepine abuse   • Marijuana use   • Tobacco use complicating pregnancy   • Supervision of high risk pregnancy, antepartum   • Intrauterine growth restriction (IUGR) affecting care of mother, second trimester, single gestation       [See the scanned report in the media tab for more details]    Impression    1.  Intrauterine pregnancy at 34w4d  2.  Normal and completed anatomic survey  3.  Normal Interval Growth with EFW at 12 % and AC at 22%.  4.  Reassuring biophysical profile, ULICES 10.5    Relevant comparison data available:  [x]         Torrey Strong MD  2017 3:17 PM

## 2017-08-18 ENCOUNTER — HOSPITAL ENCOUNTER (OUTPATIENT)
Dept: ULTRASOUND IMAGING | Facility: HOSPITAL | Age: 22
Discharge: HOME | End: 2017-08-18
Attending: OBSTETRICS & GYNECOLOGY | Admitting: OBSTETRICS & GYNECOLOGY

## 2017-08-18 DIAGNOSIS — O36.5931 IUGR (INTRAUTERINE GROWTH RESTRICTION) AFFECTING CARE OF MOTHER, THIRD TRIMESTER, FETUS 1: ICD-10-CM

## 2017-08-18 PROCEDURE — 76821 MIDDLE CEREBRAL ARTERY ECHO: CPT

## 2017-08-18 PROCEDURE — 76819 FETAL BIOPHYS PROFIL W/O NST: CPT

## 2017-08-18 PROCEDURE — 76820 UMBILICAL ARTERY ECHO: CPT

## 2017-08-23 ENCOUNTER — ROUTINE PRENATAL (OUTPATIENT)
Dept: OBSTETRICS AND GYNECOLOGY | Facility: CLINIC | Age: 22
End: 2017-08-23

## 2017-08-23 VITALS — DIASTOLIC BLOOD PRESSURE: 60 MMHG | BODY MASS INDEX: 22.2 KG/M2 | WEIGHT: 133.4 LBS | SYSTOLIC BLOOD PRESSURE: 120 MMHG

## 2017-08-23 DIAGNOSIS — O99.333 TOBACCO USE COMPLICATING PREGNANCY, THIRD TRIMESTER: ICD-10-CM

## 2017-08-23 DIAGNOSIS — Z36.85 ANTENATAL SCREENING FOR STREPTOCOCCUS B: ICD-10-CM

## 2017-08-23 DIAGNOSIS — Z13.9 SCREENING: ICD-10-CM

## 2017-08-23 DIAGNOSIS — O09.93 SUPERVISION OF HIGH RISK PREGNANCY, ANTEPARTUM, THIRD TRIMESTER: Primary | ICD-10-CM

## 2017-08-23 DIAGNOSIS — O36.5920 INTRAUTERINE GROWTH RESTRICTION (IUGR) AFFECTING CARE OF MOTHER, SECOND TRIMESTER, SINGLE GESTATION: ICD-10-CM

## 2017-08-23 LAB
BILIRUB BLD-MCNC: NEGATIVE MG/DL
CLARITY, POC: CLEAR
COLOR UR: YELLOW
GLUCOSE UR STRIP-MCNC: NEGATIVE MG/DL
KETONES UR QL: NEGATIVE
LEUKOCYTE EST, POC: ABNORMAL
NITRITE UR-MCNC: NEGATIVE MG/ML
PH UR: 7 [PH] (ref 5–8)
PROT UR STRIP-MCNC: NEGATIVE MG/DL
RBC # UR STRIP: NEGATIVE /UL
SP GR UR: 1.01 (ref 1–1.03)
UROBILINOGEN UR QL: NORMAL

## 2017-08-23 PROCEDURE — 81002 URINALYSIS NONAUTO W/O SCOPE: CPT | Performed by: OBSTETRICS & GYNECOLOGY

## 2017-08-23 PROCEDURE — 0502F SUBSEQUENT PRENATAL CARE: CPT | Performed by: OBSTETRICS & GYNECOLOGY

## 2017-08-23 RX ORDER — ONDANSETRON 2 MG/ML
4 INJECTION INTRAMUSCULAR; INTRAVENOUS EVERY 6 HOURS PRN
Status: CANCELLED | OUTPATIENT
Start: 2017-08-23

## 2017-08-23 RX ORDER — ONDANSETRON 4 MG/1
4 TABLET, ORALLY DISINTEGRATING ORAL EVERY 6 HOURS PRN
Status: CANCELLED | OUTPATIENT
Start: 2017-08-23

## 2017-08-23 RX ORDER — DEXTROSE AND SODIUM CHLORIDE 5; .9 G/100ML; G/100ML
125 INJECTION, SOLUTION INTRAVENOUS CONTINUOUS
Status: CANCELLED | OUTPATIENT
Start: 2017-08-23

## 2017-08-23 RX ORDER — OXYCODONE HYDROCHLORIDE AND ACETAMINOPHEN 5; 325 MG/1; MG/1
2 TABLET ORAL EVERY 4 HOURS PRN
Status: CANCELLED | OUTPATIENT
Start: 2017-08-23 | End: 2017-09-02

## 2017-08-23 RX ORDER — CARBOPROST TROMETHAMINE 250 UG/ML
250 INJECTION, SOLUTION INTRAMUSCULAR AS NEEDED
Status: CANCELLED | OUTPATIENT
Start: 2017-08-23

## 2017-08-23 RX ORDER — SODIUM CHLORIDE 0.9 % (FLUSH) 0.9 %
1-10 SYRINGE (ML) INJECTION AS NEEDED
Status: CANCELLED | OUTPATIENT
Start: 2017-08-23

## 2017-08-23 RX ORDER — ONDANSETRON 4 MG/1
4 TABLET, FILM COATED ORAL EVERY 6 HOURS PRN
Status: CANCELLED | OUTPATIENT
Start: 2017-08-23

## 2017-08-23 RX ORDER — LIDOCAINE HYDROCHLORIDE 10 MG/ML
5 INJECTION, SOLUTION INFILTRATION; PERINEURAL AS NEEDED
Status: CANCELLED | OUTPATIENT
Start: 2017-08-23

## 2017-08-23 RX ORDER — IBUPROFEN 200 MG
600 TABLET ORAL EVERY 6 HOURS PRN
Status: CANCELLED | OUTPATIENT
Start: 2017-08-23

## 2017-08-23 RX ORDER — TERBUTALINE SULFATE 1 MG/ML
0.25 INJECTION, SOLUTION SUBCUTANEOUS AS NEEDED
Status: CANCELLED | OUTPATIENT
Start: 2017-08-23

## 2017-08-23 RX ORDER — METHYLERGONOVINE MALEATE 0.2 MG/ML
200 INJECTION INTRAVENOUS AS NEEDED
Status: CANCELLED | OUTPATIENT
Start: 2017-08-23

## 2017-08-23 RX ORDER — FAMOTIDINE 10 MG
20 TABLET ORAL EVERY 12 HOURS SCHEDULED
Status: CANCELLED | OUTPATIENT
Start: 2017-08-23

## 2017-08-23 RX ORDER — OXYCODONE HYDROCHLORIDE AND ACETAMINOPHEN 5; 325 MG/1; MG/1
1 TABLET ORAL EVERY 4 HOURS PRN
Status: CANCELLED | OUTPATIENT
Start: 2017-08-23 | End: 2017-09-02

## 2017-08-23 RX ORDER — FAMOTIDINE 10 MG/ML
20 INJECTION, SOLUTION INTRAVENOUS EVERY 12 HOURS SCHEDULED
Status: CANCELLED | OUTPATIENT
Start: 2017-08-23

## 2017-08-23 RX ORDER — ACETAMINOPHEN 325 MG/1
650 TABLET ORAL EVERY 4 HOURS PRN
Status: CANCELLED | OUTPATIENT
Start: 2017-08-23

## 2017-08-23 NOTE — PROGRESS NOTES
Pt states no c/o  GBS collected   On 8/18 at [facility] BPP 8/8 and dopplers wnl - MFM rec delivery 37-39 weeks - repeating growth this Friday  Last growth at [facility] on 8/11 - EFW 4%  PTL warnings   IOL scheduled 9/1/17

## 2017-08-25 ENCOUNTER — HOSPITAL ENCOUNTER (OUTPATIENT)
Dept: ULTRASOUND IMAGING | Facility: HOSPITAL | Age: 22
Discharge: HOME | End: 2017-08-25
Attending: OBSTETRICS & GYNECOLOGY | Admitting: OBSTETRICS & GYNECOLOGY

## 2017-08-25 DIAGNOSIS — O36.5931 IUGR (INTRAUTERINE GROWTH RESTRICTION) AFFECTING CARE OF MOTHER, THIRD TRIMESTER, FETUS 1: ICD-10-CM

## 2017-08-25 PROCEDURE — 76819 FETAL BIOPHYS PROFIL W/O NST: CPT

## 2017-08-25 PROCEDURE — 76820 UMBILICAL ARTERY ECHO: CPT

## 2017-08-25 PROCEDURE — 76821 MIDDLE CEREBRAL ARTERY ECHO: CPT

## 2017-08-25 PROCEDURE — 76816 OB US FOLLOW-UP PER FETUS: CPT

## 2017-08-25 NOTE — CONSULTS
[facility]  Maternal-Fetal Medicine Consult Note    Dear Dr. Hedrick:     I saw the above named patient for consultation upon your request due to poor fetal growth.     I appreciate you sending a copy of the patient's prenatal record which I reviewed.     Slow but continued fetal growth of 306 gms in 2 weeks.     Advise delivery at > 37 weeks.     A repeat BPP and Dopplers is SCHEDULED in the [facility] on Tuesday.  However, the appointment can be canceled if you desire to make other arrangements.  BUT, please notify us if you make other arrangements.     PLEASE SEND AN ORDER AS CONFIRMATION OF AGREEMENT WITH REPEAT EXAMINATION.     Fetal Movement Monitoring was stress and reinforced.     ACOG along with the CDC recommend Tdap vaccine after 20 weeks gestation during each pregnancy and a flu vaccine during the flu season to provide passive immunity to the .     For billing purposes, 15 min spent face-to-face with the patient of which > 50% devoted to patient counseling and coordination of care, exclusive of ultrasound exam.     If you have any questions about the results of this sonogram or my recommendations, please feel free to contact me at any time.     Thank you for referring this [facility] to [facility]  If you have any questions about the results of this examination or my recommendations, please feel free to contact me at your convenience.     Sincerely yours,    Benjamin Parisi MD  2017  2:41 PM

## 2017-08-28 ENCOUNTER — ROUTINE PRENATAL (OUTPATIENT)
Dept: OBSTETRICS AND GYNECOLOGY | Facility: CLINIC | Age: 22
End: 2017-08-28

## 2017-08-28 VITALS — WEIGHT: 134 LBS | SYSTOLIC BLOOD PRESSURE: 116 MMHG | BODY MASS INDEX: 22.3 KG/M2 | DIASTOLIC BLOOD PRESSURE: 64 MMHG

## 2017-08-28 DIAGNOSIS — O36.5920 INTRAUTERINE GROWTH RESTRICTION (IUGR) AFFECTING CARE OF MOTHER, SECOND TRIMESTER, SINGLE GESTATION: ICD-10-CM

## 2017-08-28 DIAGNOSIS — O99.019 ANEMIA AFFECTING PREGNANCY: ICD-10-CM

## 2017-08-28 DIAGNOSIS — O99.333 TOBACCO USE COMPLICATING PREGNANCY, THIRD TRIMESTER: ICD-10-CM

## 2017-08-28 DIAGNOSIS — Z13.9 SCREENING: ICD-10-CM

## 2017-08-28 DIAGNOSIS — Z87.898 HISTORY OF SUBSTANCE USE: ICD-10-CM

## 2017-08-28 DIAGNOSIS — O09.93 SUPERVISION OF HIGH RISK PREGNANCY, ANTEPARTUM, THIRD TRIMESTER: Primary | ICD-10-CM

## 2017-08-28 LAB
B-HEM STREP SPEC QL CULT: NEGATIVE
BILIRUB BLD-MCNC: NEGATIVE MG/DL
CLARITY, POC: CLEAR
COLOR UR: YELLOW
GLUCOSE UR STRIP-MCNC: NEGATIVE MG/DL
KETONES UR QL: NEGATIVE
LEUKOCYTE EST, POC: ABNORMAL
NITRITE UR-MCNC: NEGATIVE MG/ML
PH UR: 7.5 [PH] (ref 5–8)
PROT UR STRIP-MCNC: NEGATIVE MG/DL
RBC # UR STRIP: NEGATIVE /UL
SP GR UR: 1.01 (ref 1–1.03)
UROBILINOGEN UR QL: NORMAL

## 2017-08-28 PROCEDURE — 0502F SUBSEQUENT PRENATAL CARE: CPT | Performed by: OBSTETRICS & GYNECOLOGY

## 2017-08-28 PROCEDURE — 81002 URINALYSIS NONAUTO W/O SCOPE: CPT | Performed by: OBSTETRICS & GYNECOLOGY

## 2017-08-28 RX ORDER — CLOTRIMAZOLE 1 %
CREAM (GRAM) TOPICAL
COMMUNITY
Start: 2017-07-21 | End: 2017-09-06 | Stop reason: HOSPADM

## 2017-08-28 NOTE — PROGRESS NOTES
CC:  No c/o.rlr GBS PENDING.  IUGR - US at [facility] on Friday 3% - BPP at [facility] tomorrow  IOL this Friday at 37 weeks   Strict Labor/FKC warnings

## 2017-08-29 ENCOUNTER — HOSPITAL ENCOUNTER (OUTPATIENT)
Dept: ULTRASOUND IMAGING | Facility: HOSPITAL | Age: 22
Discharge: HOME | End: 2017-08-29
Attending: OBSTETRICS & GYNECOLOGY | Admitting: OBSTETRICS & GYNECOLOGY

## 2017-08-29 DIAGNOSIS — O36.5931 IUGR (INTRAUTERINE GROWTH RESTRICTION) AFFECTING CARE OF MOTHER, THIRD TRIMESTER, FETUS 1: ICD-10-CM

## 2017-08-29 PROCEDURE — 76819 FETAL BIOPHYS PROFIL W/O NST: CPT

## 2017-08-29 PROCEDURE — 76820 UMBILICAL ARTERY ECHO: CPT

## 2017-08-29 PROCEDURE — 76821 MIDDLE CEREBRAL ARTERY ECHO: CPT

## 2017-08-29 NOTE — CONSULTS
[facility]  Maternal-Fetal Medicine Consult Note      Dear Dr. Hedrick:     I saw the above named patient for consultation upon your request due to poor fetal growth.     I appreciate you sending a copy of the patient's prenatal record which I reviewed.     Normal BPP and fetal blood flow.     Plan for delivery Friday.     Encouraged daily Fetal Movement Monitoring.     For billing purposes, 15 min spent face-to-face with the patient of which > 50% devoted to patient counseling and coordination of care, exclusive of ultrasound exam.     If you have any questions about the results of this sonogram or my recommendations, please feel free to contact me at any time.     Thank you for referring this patient to the Reproductive Imaging Center at [facility].  If you have any questions about the results of this examination or my recommendations, please feel free to contact me at your convenience.     Sincerely yours,    Benjamin Parisi MD  8/29/2017  4:31 PM

## 2017-09-01 ENCOUNTER — HOSPITAL ENCOUNTER (INPATIENT)
Facility: HOSPITAL | Age: 22
LOS: 5 days | Discharge: HOME | End: 2017-09-06
Attending: OBSTETRICS & GYNECOLOGY | Admitting: OBSTETRICS & GYNECOLOGY

## 2017-09-01 DIAGNOSIS — O36.5920 INTRAUTERINE GROWTH RESTRICTION (IUGR) AFFECTING CARE OF MOTHER, SECOND TRIMESTER, SINGLE GESTATION: ICD-10-CM

## 2017-09-01 PROBLEM — O36.5990 IUGR (INTRAUTERINE GROWTH RESTRICTION) AFFECTING CARE OF MOTHER: Status: ACTIVE | Noted: 2017-09-01

## 2017-09-01 LAB
ABO GROUP BLD: NORMAL
BASOPHILS # BLD AUTO: 0.03 10*3/MM3 (ref 0–0.2)
BASOPHILS NFR BLD AUTO: 0.3 % (ref 0–1.5)
BLD GP AB SCN SERPL QL: NEGATIVE
DEPRECATED RDW RBC AUTO: 45.9 FL (ref 37–54)
EOSINOPHIL # BLD AUTO: 0.11 10*3/MM3 (ref 0–0.7)
EOSINOPHIL NFR BLD AUTO: 1.1 % (ref 0.3–6.2)
ERYTHROCYTE [DISTWIDTH] IN BLOOD BY AUTOMATED COUNT: 14.2 % (ref 11.7–13)
HCT VFR BLD AUTO: 31.8 % (ref 35.6–45.5)
HGB BLD-MCNC: 10.7 G/DL (ref 11.9–15.5)
IMM GRANULOCYTES # BLD: 0.06 10*3/MM3 (ref 0–0.03)
IMM GRANULOCYTES NFR BLD: 0.6 % (ref 0–0.5)
LYMPHOCYTES # BLD AUTO: 2.12 10*3/MM3 (ref 0.9–4.8)
LYMPHOCYTES NFR BLD AUTO: 20.5 % (ref 19.6–45.3)
MCH RBC QN AUTO: 29.7 PG (ref 26.9–32)
MCHC RBC AUTO-ENTMCNC: 33.6 G/DL (ref 32.4–36.3)
MCV RBC AUTO: 88.3 FL (ref 80.5–98.2)
MONOCYTES # BLD AUTO: 0.9 10*3/MM3 (ref 0.2–1.2)
MONOCYTES NFR BLD AUTO: 8.7 % (ref 5–12)
NEUTROPHILS # BLD AUTO: 7.13 10*3/MM3 (ref 1.9–8.1)
NEUTROPHILS NFR BLD AUTO: 68.8 % (ref 42.7–76)
PLATELET # BLD AUTO: 223 10*3/MM3 (ref 140–500)
PMV BLD AUTO: 10 FL (ref 6–12)
RBC # BLD AUTO: 3.6 10*6/MM3 (ref 3.9–5.2)
RH BLD: POSITIVE
WBC NRBC COR # BLD: 10.35 10*3/MM3 (ref 4.5–10.7)

## 2017-09-01 PROCEDURE — 85025 COMPLETE CBC W/AUTO DIFF WBC: CPT | Performed by: OBSTETRICS & GYNECOLOGY

## 2017-09-01 PROCEDURE — 3E0P7GC INTRODUCTION OF OTHER THERAPEUTIC SUBSTANCE INTO FEMALE REPRODUCTIVE, VIA NATURAL OR ARTIFICIAL OPENING: ICD-10-PCS | Performed by: OBSTETRICS & GYNECOLOGY

## 2017-09-01 PROCEDURE — 10907ZC DRAINAGE OF AMNIOTIC FLUID, THERAPEUTIC FROM PRODUCTS OF CONCEPTION, VIA NATURAL OR ARTIFICIAL OPENING: ICD-10-PCS | Performed by: OBSTETRICS & GYNECOLOGY

## 2017-09-01 PROCEDURE — 86901 BLOOD TYPING SEROLOGIC RH(D): CPT | Performed by: OBSTETRICS & GYNECOLOGY

## 2017-09-01 PROCEDURE — 3E033VJ INTRODUCTION OF OTHER HORMONE INTO PERIPHERAL VEIN, PERCUTANEOUS APPROACH: ICD-10-PCS | Performed by: OBSTETRICS & GYNECOLOGY

## 2017-09-01 PROCEDURE — 86900 BLOOD TYPING SEROLOGIC ABO: CPT | Performed by: OBSTETRICS & GYNECOLOGY

## 2017-09-01 PROCEDURE — 86850 RBC ANTIBODY SCREEN: CPT | Performed by: OBSTETRICS & GYNECOLOGY

## 2017-09-01 RX ORDER — FAMOTIDINE 20 MG/1
20 TABLET, FILM COATED ORAL EVERY 12 HOURS SCHEDULED
Status: DISCONTINUED | OUTPATIENT
Start: 2017-09-01 | End: 2017-09-03

## 2017-09-01 RX ORDER — FAMOTIDINE 10 MG/ML
20 INJECTION, SOLUTION INTRAVENOUS EVERY 12 HOURS SCHEDULED
Status: DISCONTINUED | OUTPATIENT
Start: 2017-09-01 | End: 2017-09-03

## 2017-09-01 RX ORDER — ACETAMINOPHEN 325 MG/1
650 TABLET ORAL EVERY 4 HOURS PRN
Status: DISCONTINUED | OUTPATIENT
Start: 2017-09-01 | End: 2017-09-04 | Stop reason: HOSPADM

## 2017-09-01 RX ORDER — ONDANSETRON 2 MG/ML
4 INJECTION INTRAMUSCULAR; INTRAVENOUS EVERY 6 HOURS PRN
Status: DISCONTINUED | OUTPATIENT
Start: 2017-09-01 | End: 2017-09-04 | Stop reason: HOSPADM

## 2017-09-01 RX ORDER — TERBUTALINE SULFATE 1 MG/ML
0.25 INJECTION, SOLUTION SUBCUTANEOUS AS NEEDED
Status: DISCONTINUED | OUTPATIENT
Start: 2017-09-01 | End: 2017-09-04 | Stop reason: HOSPADM

## 2017-09-01 RX ORDER — LIDOCAINE HYDROCHLORIDE 10 MG/ML
5 INJECTION, SOLUTION INFILTRATION; PERINEURAL AS NEEDED
Status: DISCONTINUED | OUTPATIENT
Start: 2017-09-01 | End: 2017-09-04 | Stop reason: HOSPADM

## 2017-09-01 RX ORDER — DEXTROSE AND SODIUM CHLORIDE 5; .9 G/100ML; G/100ML
125 INJECTION, SOLUTION INTRAVENOUS CONTINUOUS
Status: DISCONTINUED | OUTPATIENT
Start: 2017-09-01 | End: 2017-09-04

## 2017-09-01 RX ORDER — ONDANSETRON 4 MG/1
4 TABLET, FILM COATED ORAL EVERY 6 HOURS PRN
Status: DISCONTINUED | OUTPATIENT
Start: 2017-09-01 | End: 2017-09-04 | Stop reason: HOSPADM

## 2017-09-01 RX ORDER — SODIUM CHLORIDE 0.9 % (FLUSH) 0.9 %
1-10 SYRINGE (ML) INJECTION AS NEEDED
Status: DISCONTINUED | OUTPATIENT
Start: 2017-09-01 | End: 2017-09-04 | Stop reason: HOSPADM

## 2017-09-01 RX ORDER — ONDANSETRON 4 MG/1
4 TABLET, ORALLY DISINTEGRATING ORAL EVERY 6 HOURS PRN
Status: DISCONTINUED | OUTPATIENT
Start: 2017-09-01 | End: 2017-09-04 | Stop reason: HOSPADM

## 2017-09-01 RX ADMIN — SODIUM CHLORIDE, POTASSIUM CHLORIDE, SODIUM LACTATE AND CALCIUM CHLORIDE 1000 ML: 600; 310; 30; 20 INJECTION, SOLUTION INTRAVENOUS at 18:46

## 2017-09-01 RX ADMIN — DINOPROSTONE 10 MG: 10 INSERT VAGINAL at 20:33

## 2017-09-01 NOTE — PLAN OF CARE
Problem: Patient Care Overview (Adult)  Goal: Plan of Care Review  Outcome: Ongoing (interventions implemented as appropriate)    09/01/17 1859   Coping/Psychosocial Response Interventions   Plan Of Care Reviewed With patient   Patient Care Overview   Progress no change       Goal: Adult Individualization and Mutuality  Outcome: Ongoing (interventions implemented as appropriate)    09/01/17 1859   Individualization   Patient Specific Preferences jordy, bottlefeeding, epidural, FOB to cut cord.        Goal: Discharge Needs Assessment  Outcome: Ongoing (interventions implemented as appropriate)    Problem: Labor (Cervical Ripen, Induct, Augment) (Adult,Obstetrics,Pediatric)  Goal: Signs and Symptoms of Listed Potential Problems Will be Absent or Manageable (Labor)  Outcome: Ongoing (interventions implemented as appropriate)    09/01/17 1859   Labor (Cervical Ripen, Induct, Augment)   Problems Assessed (Labor) all   Problems Present (Labor) none

## 2017-09-02 LAB
AMPHET+METHAMPHET UR QL: NEGATIVE
BARBITURATES UR QL SCN: NEGATIVE
BENZODIAZ UR QL SCN: NEGATIVE
CANNABINOIDS SERPL QL: NEGATIVE
COCAINE UR QL: NEGATIVE
METHADONE UR QL SCN: NEGATIVE
OPIATES UR QL: NEGATIVE
OXYCODONE UR QL SCN: NEGATIVE

## 2017-09-02 PROCEDURE — 80307 DRUG TEST PRSMV CHEM ANLYZR: CPT | Performed by: OBSTETRICS & GYNECOLOGY

## 2017-09-02 PROCEDURE — 25810000003 DEXTROSE-NACL PER 500 ML: Performed by: OBSTETRICS & GYNECOLOGY

## 2017-09-02 PROCEDURE — 25010000002 BUTORPHANOL PER 1 MG: Performed by: OBSTETRICS & GYNECOLOGY

## 2017-09-02 RX ORDER — ZOLPIDEM TARTRATE 5 MG/1
5 TABLET ORAL NIGHTLY PRN
Status: DISCONTINUED | OUTPATIENT
Start: 2017-09-02 | End: 2017-09-06 | Stop reason: HOSPADM

## 2017-09-02 RX ORDER — BUTORPHANOL TARTRATE 1 MG/ML
1 INJECTION, SOLUTION INTRAMUSCULAR; INTRAVENOUS
Status: DISCONTINUED | OUTPATIENT
Start: 2017-09-02 | End: 2017-09-04

## 2017-09-02 RX ORDER — OXYTOCIN/RINGER'S LACTATE 10/500ML
2 PLASTIC BAG, INJECTION (ML) INTRAVENOUS
Status: DISCONTINUED | OUTPATIENT
Start: 2017-09-02 | End: 2017-09-02

## 2017-09-02 RX ADMIN — DEXTROSE AND SODIUM CHLORIDE 125 ML/HR: 5; 900 INJECTION, SOLUTION INTRAVENOUS at 02:48

## 2017-09-02 RX ADMIN — Medication 2 MILLI-UNITS/MIN: at 09:25

## 2017-09-02 RX ADMIN — DEXTROSE AND SODIUM CHLORIDE 125 ML/HR: 5; 900 INJECTION, SOLUTION INTRAVENOUS at 10:30

## 2017-09-02 RX ADMIN — BUTORPHANOL TARTRATE 1 MG: 1 INJECTION, SOLUTION INTRAMUSCULAR; INTRAVENOUS at 15:14

## 2017-09-02 RX ADMIN — ZOLPIDEM TARTRATE 5 MG: 5 TABLET, FILM COATED ORAL at 21:41

## 2017-09-02 RX ADMIN — DINOPROSTONE 10 MG: 10 INSERT VAGINAL at 17:56

## 2017-09-02 RX ADMIN — DEXTROSE AND SODIUM CHLORIDE 125 ML/HR: 5; 900 INJECTION, SOLUTION INTRAVENOUS at 17:53

## 2017-09-02 NOTE — H&P
[facility]  Obstetric History and Physical    Chief Complaint   Patient presents with   • Scheduled Induction     SGA       Subjective     Patient is a 22 y.o. female  currently at 37w1d, who presents for induction of labor for IUGR. Pt has been followed for SGA and induction advised by her primary OB now that she is 37 weeks. Patient denies vaginal bleeding or leaking fluid. She reports normal fetal movement. She denies abdominal pain, headache, SOA or chest pain.     Her prenatal care is complicated by : .  Patient Active Problem List   Diagnosis   • History of substance use   • Benzodiazepine abuse   • Marijuana use   • Tobacco use complicating pregnancy   • Supervision of high risk pregnancy, antepartum   • Intrauterine growth restriction (IUGR) affecting care of mother, second trimester, single gestation   • Anemia affecting pregnancy   • IUGR (intrauterine growth restriction) affecting care of mother       Her previous obstetric/gynecological history is noted for prior term vaginal delivery    The following portions of the patients history were reviewed and updated as appropriate: current medications, allergies, past medical history, past surgical history, past family history, past social history and problem list .       Prenatal Information:  Prenatal Results         1st Trimester Ref. Range Date Time   CBC with auto diff ^ 12.9/38/328   17    Rubella IgG  3.41 index Immune >0.99 index 17 1403   Hepatitis B SAg  Negative  Negative 17 1403   RPR  Non Reactive  Non Reactive 17 1403   ABO  A   17 1846   Rh  Positive   17 1846   Anibody Screen  Negative   17 1846   HIV  Non Reactive  Non Reactive 17 1403   Varicella IgG ^ immune   17    Urinalysis with microscopy       Urine Culture ^ no growth   17    GC/Chlamydia/TV ^ neg for all 3   17    ThinPrep/Pap       2nd and 3rd Trimester Ref. Range Date Time   Hemoglobin / Hematocrit  31.8 %  (L) 35.6 - 45.5 % 09/01/17 1846   Hemoglobin  10.7 g/dL (L) 11.9 - 15.5 g/dL 09/01/17 1846   Group B Strep Screen       Group B Strep Culture  Negative  Negative 08/23/17 1149   GCT ^ 84   07/17/17    Glucose Fasting       Glucose 1 Hr       Glucose 2 Hr       Glucose 3 Hr       Pre-eclampsia Panel       Risk Screening Ref. Range Date Time   Fetal Fibronectin       Amnisure       Hepatitis C Antibody  <0.1 s/co ratio 0.0 - 0.9 s/co ratio 03/06/17 1403   Hemoglobin electrophoresis       Cystic Fibrosis ^ decline   03/06/17    Hemoglobin A1C       MSAFP - 4       NIPT ^ neg, its a boy   03/06/17    AFP       Parvovirus IgG       Parvovirus IgM       POCT - glucose       [facility]       24 Hour urine - Total protein       24 Hour urine - Creatinine clearance       Urinalysis with microscopy       Urine Culture ^ no growth   03/06/17    Drug Screening Ref. Range Date Time   Amphetamine Screen  Negative ng/mL Dwwuiz=0708 ng/mL 07/14/17 1547   Barbiturate Screen  Negative ng/mL Rqdapq=272 ng/mL 07/14/17 1547   Benzodiazepine Screen  Negative  Vcrhgp=929 07/14/17 1547   Methadone Screen  Negative ng/mL Efxkmm=263 ng/mL 07/14/17 1547   Phencyclidine Screen  Negative ng/mL Cutoff=25 ng/mL 07/14/17 1547   Opiates Screen  Negative ng/mL Mepwtg=691 ng/mL 07/14/17 1547   THC Screen  Positive  (A) Cutoff=50 07/14/17 1547   Cocaine Screen  Negative ng/mL Mnifga=995 ng/mL 07/14/17 1547   Propoxyphene Screen  Negative ng/mL Meafgq=213 ng/mL 07/14/17 1547   Buprenorphine Screen       Methamphetamine Screen       Oxycodone Screen       Tryicyclic Antidepressants Screen              Legend: ^: Historical            View all results for this pregnancy        External Prenatal Results         Pregnancy Outside Results - these were transcribed from office records.  See scanned records for details. Date Time   Hgb      Hct      ABO      Rh      Antibody Screen      Glucose Fasting GTT      Glucose Tolerance Test 1 hour ^ 84  07/17/17     Glucose Tolerance Test 3 hour      Gonorrhea (discrete)      Chlamydia (discrete)      RPR      VDRL      Syphillis Antibody      Rubella      HBsAg      Herpes Simplex Virus PCR      Herpes Simplex VIrus Culture      HIV      Hep C RNA Quant PCR      Hep C Antibody      Urine Drug Screen      AFP      Group B Strep      GBS Susceptibility to Clindamycin      GBS Susceptibility to Eythromycin      Fetal Fibronectin      Genetic Testing, Maternal Blood             Legend: ^: Historical           Past OB History:     Obstetric History       T1      TAB0   SAB0   E0   M0   L1       # Outcome Date GA Lbr Angel/2nd Weight Sex Delivery Anes PTL Lv   2 Current            1 Term 14 39w4d  5 lb 12 oz (2.608 kg) F Vag-Spont EPI N Y      Name: Dorothea      Obstetric Comments   5.10.17 - 20-6 weeks - Normal, but incomplete anatomy, EFW 7% - F     6.6.17 - 24-5 weeks - EFW 4.2%, AC 8.0%, completed anatomy - [facility]   /17 - 28-1 weeks - NIKKO- EFW 6%, AC 3% 2lb, 2oz - F   2017 33w5d reassuring BPP 8, ULICES: nl hb    2017 34w4d normal interval growth: EFW 12% AC 22%, BPP 8/8  hb    8.25.17 - 36-1 weeks - EFW 3%, ULICES - 11.3 cm BPp  - [facility]          Past Medical History: History reviewed. No pertinent past medical history.   Past Surgical History Past Surgical History:   Procedure Laterality Date   • NO PAST SURGERIES        Family History: Family History   Problem Relation Age of Onset   • Diabetes Maternal Grandmother    • Breast cancer Neg Hx    • Ovarian cancer Neg Hx    • Uterine cancer Neg Hx    • Colon cancer Neg Hx    • Deep vein thrombosis Neg Hx    • Pulmonary embolism Neg Hx    • Osteoporosis Neg Hx       Social History:  reports that she quit smoking about 4 weeks ago. She has never used smokeless tobacco.   reports that she does not drink alcohol.   reports that she does not use illicit drugs.        General ROS: Pertinent items are noted in HPI    Objective       Vital Signs Range for  the last 24 hours  Temperature: Temp:  [98.2 °F (36.8 °C)] 98.2 °F (36.8 °C)   Temp Source: Temp src: Oral   BP: BP: (133)/(82) 133/82   Pulse: Heart Rate:  [75] 75   Respirations: Resp:  [18] 18   SPO2:     O2 Amount (l/min):     O2 Devices     Weight:       Physical Examination: General appearance - alert, well appearing, and in no distress  Mental status - alert, oriented to person, place, and time  Neck - supple, no significant adenopathy  Chest - clear to auscultation, no wheezes, rales or rhonchi, symmetric air entry  Heart - normal rate and regular rhythm  Abdomen - gravid, nontender  Neurological - alert, oriented, normal speech, no focal findings or movement disorder noted, motor and sensory grossly normal bilaterally  Extremities - no cords, nontender, no edema    Presentation:    Cervix: Exam by: Method: sterile exam per RN   Dilation: Dilation: 0   Effacement: Cervical Effacement: 50%   Station: Station: -2       Fetal Heart Rate Assessment   Method: Fetal HR Assessment Method: external   Beats/min: Fetal HR (Beats/Min): 125   Baseline: Fetal HR Baseline: normal range (110-160 bpm)   Varibility: Fetal HR Variability: moderate (amplitude range 6 to 25 bpm)   Accels: Fetal HR Accelerations: lasting at least 15 seconds, greater than/equal to 15 bpm   Decels: Fetal HR Decelerations: absent   Tracing Category: Fetal HR Tracing Category: Category I     Uterine Assessment   Method: Method: TOCO (external toco transducer), palpation   Frequency (min):     Ctx Count in 10 min:     Duration:     Intensity: Contraction Intensity: no contractions   Intensity by IUPC:     Resting Tone: Uterine Resting Tone: soft by palpation   Resting Tone by IUPC:     [place] Units:       Laboratory Results:   Lab Results   Component Value Date    WBC 10.35 09/01/2017    HGB 10.7 (L) 09/01/2017    HCT 31.8 (L) 09/01/2017    MCV 88.3 09/01/2017     09/01/2017       Results from last 7 days  Lab Units 09/01/17  1846   ABO  TYPING  A   RH TYPING  Positive         Assessment/Plan     Active Problems:    History of substance use    Anemia affecting pregnancy    IUGR (intrauterine growth restriction) affecting care of mother        Assessment:  1.  Intrauterine pregnancy at 37w1d weeks gestation with reactive fetal status.    2.  induction of labor  for IUGR  with unfavorable cervix  3.  Obstetrical history significant for substance abuse, tobacco use, anemia and SGA  4.  GBS status: negative  Plan:  1. fetal and uterine monitoring  continuously and cervical ripening with  Cervidil  2. Plan of care has been reviewed with patient and partner  3.  Risks, benefits of treatment plan have been discussed.  4.  All questions have been answered.        Gwen Ferris MD  9/1/2017  9:25 PM

## 2017-09-02 NOTE — PROGRESS NOTES
[facility]      Name:  Annabella Padgett        MR#:  [id number]      Progress Note    Brief note:  22 y.o.  at 37w2d admitted for delivery due to IUGR - received cervidil overnight and Pitocin today with no cervical change - will redo cervidil after break to eat and shower. Patient denies complaints.      Patient Active Problem List   Diagnosis   • History of substance use   • Benzodiazepine abuse   • Marijuana use   • Tobacco use complicating pregnancy   • Supervision of high risk pregnancy, antepartum   • Intrauterine growth restriction (IUGR) affecting care of mother, second trimester, single gestation   • Anemia affecting pregnancy   • IUGR (intrauterine growth restriction) affecting care of mother       OB History    Para Term  AB SAB TAB Ectopic Multiple Living   2 1 1 0 0 0 0 0 0 1      # Outcome Date GA Lbr Angel/2nd Weight Sex Delivery Anes PTL Lv   2 Current            1 Term 14 39w4d  5 lb 12 oz (2.608 kg) F Vag-Spont EPI N Y      Birth Comments: del note reviewed - no comps - [facility]       Obstetric Comments   5.10.17 - 20-6 weeks - Normal, but incomplete anatomy, EFW 7% - [facility]     6.6.17 - 24-5 weeks - EFW 4.2%, AC 8.0%, completed anatomy - [facility]   /17 - 28-1 weeks - NIKKO- EFW 6%, AC 3% 2lb, 2oz - [facility]   2017 33w5d reassuring BPP , ULICES: nl hb    2017 34w4d normal interval growth: EFW 12% AC 22%, BPP   hb    8.25.17 - 36-1 weeks - EFW 3%, ULICES - 11.3 cm BPp  - [facility]        #: 1, Date: 14, Sex: Female, Weight: 5 lb 12 oz (2.608 kg), GA: 39w4d, Delivery: Vaginal, Spontaneous Delivery, Apgar1: None, Apgar5: None, Living: Yes, Birth Comments: del note reviewed - no comps - [facility]     #: 2, Date: None, Sex: None, Weight: None, GA: None, Delivery: None, Apgar1: None, Apgar5: None, Living: None, Birth Comments: None      Review of Systems    Objective     Vitals:  Temperature: Temp:  [98.1 °F (36.7 °C)-98.6 °F (37 °C)] 98.2 °F (36.8 °C)  Temp  Source: Temp src: Oral  BP:  BP: ()/(44-82) 123/59  Pulse:  Heart Rate:  [56-90] 56  Respirations: Resp:  [16-18] 18     Physical Examination: General appearance - alert, well appearing, and in no distress  Abdomen - soft, nontender, nondistended, no masses or organomegaly  Musculoskeletal - no joint tenderness, deformity or swelling  Extremities - peripheral pulses normal, no pedal edema, no clubbing or cyanosis  Skin - normal coloration and turgor, no rashes, no suspicious skin lesions noted    LABS:   Lab Results   Component Value Date    WBC 10.35 09/01/2017    HGB 10.7 (L) 09/01/2017    HCT 31.8 (L) 09/01/2017    MCV 88.3 09/01/2017     09/01/2017       Non-stress test:  Cat 1    Assessment/Plan   1. IUP @ 37w2d  2. IUGR induction - will repeat cervidil   3. FSR       Caridad Hedrick MD  9/2/2017 4:42 PM

## 2017-09-02 NOTE — PLAN OF CARE
Problem: Patient Care Overview (Adult)  Goal: Plan of Care Review  Outcome: Ongoing (interventions implemented as appropriate)    09/02/17 0600   Coping/Psychosocial Response Interventions   Plan Of Care Reviewed With patient   Patient Care Overview   Progress progress toward functional goals as expected       Goal: Adult Individualization and Mutuality  Outcome: Ongoing (interventions implemented as appropriate)    09/01/17 1859 09/02/17 0600   Individualization   Patient Specific Preferences jordy, bottlefeeding, epidural, FOB to cut cord.  --    Patient Specific Goals --  Vaginal delivery   Patient Specific Interventions --  Epidural   Mutuality/Individual Preferences   What Anxieties, Fears or Concerns Do You Have About Your Health or Care? --  Does not want a c/s   What Questions Do You Have About Your Health or Care? --  None   What Information Would Help Us Give You More Personalized Care? --  FOB         Problem: Labor (Cervical Ripen, Induct, Augment) (Adult,Obstetrics,Pediatric)  Goal: Signs and Symptoms of Listed Potential Problems Will be Absent or Manageable (Labor)  Outcome: Ongoing (interventions implemented as appropriate)    09/02/17 0600   Labor (Cervical Ripen, Induct, Augment)   Problems Assessed (Labor) all   Problems Present (Labor) none

## 2017-09-02 NOTE — PLAN OF CARE
Problem: Patient Care Overview (Adult)  Goal: Plan of Care Review  Outcome: Ongoing (interventions implemented as appropriate)    17   Coping/Psychosocial Response Interventions   Plan Of Care Reviewed With patient   Patient Care Overview   Progress no change   Outcome Evaluation   Outcome Summary/Follow up Plan Pt up to 20mu/min of pitocin, no change to SVE, second cervidil placed tonight.       Goal: Adult Individualization and Mutuality  Outcome: Ongoing (interventions implemented as appropriate)    17 1859 17 0600 17   Individualization   Patient Specific Preferences jordy, bottlefeeding, epidural, FOB to cut cord.  --  --    Patient Specific Goals --  Vaginal delivery --    Patient Specific Interventions --  --  epidural   Mutuality/Individual Preferences   What Anxieties, Fears or Concerns Do You Have About Your Health or Care? --  Does not want a c/s --    What Questions Do You Have About Your Health or Care? --  --  questions about plan of care, cervidil       Goal: Discharge Needs Assessment  Outcome: Ongoing (interventions implemented as appropriate)    17   Discharge Needs Assessment   Concerns To Be Addressed no discharge needs identified         Problem: Labor (Cervical Ripen, Induct, Augment) (Adult,Obstetrics,Pediatric)  Goal: Signs and Symptoms of Listed Potential Problems Will be Absent or Manageable (Labor)  Outcome: Ongoing (interventions implemented as appropriate)    17   Labor (Cervical Ripen, Induct, Augment)   Problems Assessed (Labor) all   Problems Present (Labor) pain         Problem: Fall Risk  (Adult,Obstetrics,Pediatric)  Goal: Identify Related Risk Factors and Signs and Symptoms  Outcome: Ongoing (interventions implemented as appropriate)  Goal: Absence of Maternal Fall  Outcome: Ongoing (interventions implemented as appropriate)

## 2017-09-03 ENCOUNTER — ANESTHESIA (OUTPATIENT)
Dept: LABOR AND DELIVERY | Facility: HOSPITAL | Age: 22
End: 2017-09-03

## 2017-09-03 ENCOUNTER — ANESTHESIA EVENT (OUTPATIENT)
Dept: LABOR AND DELIVERY | Facility: HOSPITAL | Age: 22
End: 2017-09-03

## 2017-09-03 PROCEDURE — 99024 POSTOP FOLLOW-UP VISIT: CPT | Performed by: OBSTETRICS & GYNECOLOGY

## 2017-09-03 PROCEDURE — 25010000002 BUTORPHANOL PER 1 MG: Performed by: OBSTETRICS & GYNECOLOGY

## 2017-09-03 PROCEDURE — 25810000003 DEXTROSE-NACL PER 500 ML: Performed by: OBSTETRICS & GYNECOLOGY

## 2017-09-03 PROCEDURE — C1755 CATHETER, INTRASPINAL: HCPCS | Performed by: ANESTHESIOLOGY

## 2017-09-03 RX ORDER — EPHEDRINE SULFATE 50 MG/ML
5 INJECTION, SOLUTION INTRAVENOUS AS NEEDED
Status: DISCONTINUED | OUTPATIENT
Start: 2017-09-03 | End: 2017-09-04 | Stop reason: HOSPADM

## 2017-09-03 RX ORDER — OXYTOCIN/RINGER'S LACTATE 10/500ML
2-30 PLASTIC BAG, INJECTION (ML) INTRAVENOUS
Status: DISCONTINUED | OUTPATIENT
Start: 2017-09-03 | End: 2017-09-04

## 2017-09-03 RX ORDER — FAMOTIDINE 10 MG/ML
20 INJECTION, SOLUTION INTRAVENOUS ONCE AS NEEDED
Status: DISCONTINUED | OUTPATIENT
Start: 2017-09-03 | End: 2017-09-04 | Stop reason: HOSPADM

## 2017-09-03 RX ORDER — FAMOTIDINE 20 MG/1
20 TABLET, FILM COATED ORAL EVERY 12 HOURS PRN
Status: DISCONTINUED | OUTPATIENT
Start: 2017-09-03 | End: 2017-09-04 | Stop reason: HOSPADM

## 2017-09-03 RX ORDER — ONDANSETRON 2 MG/ML
4 INJECTION INTRAMUSCULAR; INTRAVENOUS ONCE AS NEEDED
Status: DISCONTINUED | OUTPATIENT
Start: 2017-09-03 | End: 2017-09-04 | Stop reason: HOSPADM

## 2017-09-03 RX ORDER — DIPHENHYDRAMINE HYDROCHLORIDE 50 MG/ML
12.5 INJECTION INTRAMUSCULAR; INTRAVENOUS EVERY 8 HOURS PRN
Status: DISCONTINUED | OUTPATIENT
Start: 2017-09-03 | End: 2017-09-04 | Stop reason: HOSPADM

## 2017-09-03 RX ORDER — FAMOTIDINE 10 MG/ML
20 INJECTION, SOLUTION INTRAVENOUS EVERY 12 HOURS PRN
Status: DISCONTINUED | OUTPATIENT
Start: 2017-09-03 | End: 2017-09-04 | Stop reason: HOSPADM

## 2017-09-03 RX ADMIN — Medication 2 MILLI-UNITS/MIN: at 07:08

## 2017-09-03 RX ADMIN — DEXTROSE AND SODIUM CHLORIDE 125 ML/HR: 5; 900 INJECTION, SOLUTION INTRAVENOUS at 18:15

## 2017-09-03 RX ADMIN — Medication 10 ML/HR: at 13:01

## 2017-09-03 RX ADMIN — LIDOCAINE HYDROCHLORIDE AND EPINEPHRINE 3 ML: 15; 5 INJECTION, SOLUTION EPIDURAL at 12:57

## 2017-09-03 RX ADMIN — CEFAZOLIN SODIUM 1 G: 1 INJECTION, SOLUTION INTRAVENOUS at 23:45

## 2017-09-03 RX ADMIN — DEXTROSE AND SODIUM CHLORIDE 125 ML/HR: 5; 900 INJECTION, SOLUTION INTRAVENOUS at 09:31

## 2017-09-03 RX ADMIN — Medication 20 MILLI-UNITS/MIN: at 19:05

## 2017-09-03 RX ADMIN — BUTORPHANOL TARTRATE 1 MG: 1 INJECTION, SOLUTION INTRAMUSCULAR; INTRAVENOUS at 03:48

## 2017-09-03 RX ADMIN — SODIUM CHLORIDE, POTASSIUM CHLORIDE, SODIUM LACTATE AND CALCIUM CHLORIDE 1000 ML: 600; 310; 30; 20 INJECTION, SOLUTION INTRAVENOUS at 12:20

## 2017-09-03 RX ADMIN — DEXTROSE AND SODIUM CHLORIDE 125 ML/HR: 5; 900 INJECTION, SOLUTION INTRAVENOUS at 01:38

## 2017-09-03 NOTE — PROGRESS NOTES
"[facility]  Obstetric Progress Note    Chief Complaint   Patient presents with   • Scheduled Induction     SGA       Subjective     Patient:    The patient feels well. Starting to feel some contractions      Review of Systems - General ROS: negative - no fever , no headache, no leg pain      Objective     Vital Signs Range for the last 24 hours  Temp:  [98.1 °F (36.7 °C)-98.6 °F (37 °C)] 98.6 °F (37 °C)   Temp src: Oral   BP: (104-140)/(47-77) 115/59   Heart Rate:  [] 90   Resp:  [16-18] 18                       Flowsheet Rows         First Filed Value    Admission Height  66\" (167.6 cm) Documented at 09/01/2017 1831    Admission Weight  134 lb 3.2 oz (60.9 kg) Documented at 09/01/2017 2152          Intake/Output last 24 hours:      Intake/Output Summary (Last 24 hours) at 09/03/17 1010  Last data filed at 09/03/17 0743   Gross per 24 hour   Intake             3236 ml   Output             3750 ml   Net             -514 ml       Intake/Output this shift:    I/O this shift:  In: 1027 [I.V.:1027]  Out: 300 [Urine:300]    Physical Exam:  General: Patient is comfortable and well appearing   Heart CVS exam: normal rate and regular rhythm.   Lungs Chest: no tachypnea, retractions or cyanosis.     Abdomen Abdominal exam: soft, nontender, nondistended, no masses or organomegaly.   Extremities Exam of extremities: peripheral pulses normal, no pedal edema, no clubbing or cyanosis     Presentation: vtx   Cervix: Exam by: Method: sterile exam per RN   Dilation: Dilation: 0.5   Effacement: Cervical Effacement: 60%   Station: Station: -2         Fetal Heart Rate Assessment   Method: Fetal HR Assessment Method: external   Beats/min: Fetal HR (Beats/Min): 135   Baseline: Fetal HR Baseline: normal range (110-160 bpm)   Varibility: Fetal HR Variability: moderate (amplitude range 6 to 25 bpm)   Accels: Fetal HR Accelerations: absent   Decels: Fetal HR Decelerations: absent   Tracing Category: Fetal HR Tracing Category: " Category I     Uterine Assessment   Method: Method: TOCO (external toco transducer)   Frequency (min): Contraction Frequency (min): 4-5   Ctx Count in 10 min: Contraction Frequency in 10min: 3   Duration: Contraction Duration (sec): 50-60   Intensity: Contraction Intensity: mild by palpation   Intensity by IUPC:     Resting Tone: Uterine Resting Tone: relaxation >60 sec   Resting Tone by IUPC:     [place] Units:       Lab Results (last 24 hours)     ** No results found for the last 24 hours. **              Assessment/Plan     Active Problems:    History of substance use    Anemia affecting pregnancy    IUGR (intrauterine growth restriction) affecting care of mother        Assessment:  1.  Intrauterine pregnancy at 37w3d weeks gestation with reactive fetal status.    2.  induction of labor  for IUGR   with unfavorable cervix  3.  Obstetrical history significant for is non-contributory.  4.  GBS status: negative    Plan:  1. fetal and uterine monitoring  continuously, cervical ripening with  Cervidil, labor augmentation  Pitocin and analgesia with  epidural  2. Plan of care has been reviewed with patient and family  3.  Risks, benefits of treatment plan have been discussed.  4.  All questions have been answered.  5.  Anticipate       Yue Saleem MD 9/3/2017 10:10 AM

## 2017-09-03 NOTE — ANESTHESIA PREPROCEDURE EVALUATION
Anesthesia Evaluation     no history of anesthetic complications:  NPO Solid Status: N/A       Airway   Mallampati: II  TM distance: >3 FB  Neck ROM: full  Dental - normal exam     Pulmonary - negative pulmonary ROS and normal exam   Cardiovascular - negative cardio ROS    Rhythm: regular        Neuro/Psych- negative ROS  GI/Hepatic/Renal/Endo      Musculoskeletal (-) negative ROS    Abdominal    Substance History      OB/GYN    (+) Pregnant,         Other                                        Anesthesia Plan    ASA 2     epidural   (- tobacco  )  intravenous induction

## 2017-09-03 NOTE — PLAN OF CARE
Problem: Patient Care Overview (Adult)  Goal: Adult Individualization and Mutuality  Outcome: Ongoing (interventions implemented as appropriate)    17 [id number] 0600 17 1820   Individualization   Patient Specific Preferences jordy, bottlefeeding, epidural, FOB to cut cord.  --  --    Patient Specific Goals --  Vaginal delivery --    Patient Specific Interventions --  --  epidural   Mutuality/Individual Preferences   What Anxieties, Fears or Concerns Do You Have About Your Health or Care? --  Does not want a c/s --    What Questions Do You Have About Your Health or Care? --  --  questions about plan of care, cervidil       Goal: Discharge Needs Assessment  Outcome: Ongoing (interventions implemented as appropriate)    17 0552   Discharge Needs Assessment   Concerns To Be Addressed no discharge needs identified   Readmission Within The Last 30 Days no previous admission in last 30 days   Equipment Needed After Discharge none   Discharge Disposition home or self-care   Current Health   Anticipated Changes Related to Illness none   Self-Care   Equipment Currently Used at Home none   Living Environment   Transportation Available car         Problem: Labor (Cervical Ripen, Induct, Augment) (Adult,Obstetrics,Pediatric)  Goal: Signs and Symptoms of Listed Potential Problems Will be Absent or Manageable (Labor)  Outcome: Ongoing (interventions implemented as appropriate)    17 0552   Labor (Cervical Ripen, Induct, Augment)   Problems Assessed (Labor) all   Problems Present (Labor) pain         Problem: Fall Risk  (Adult,Obstetrics,Pediatric)  Goal: Identify Related Risk Factors and Signs and Symptoms  Outcome: Ongoing (interventions implemented as appropriate)    17 0552   Fall Risk    Fall Risk: Related Risk Factors medication side effects;pain, severe    Fall Risk: Signs and Symptoms presence of fall risk factors       Goal: Absence of Maternal Fall  Outcome:  Ongoing (interventions implemented as appropriate)    17 0552   Fall Risk  (Adult,Obstetrics,Pediatric)   Absence of Maternal Fall achieves outcome       Goal: Absence of  Fall/Drop  Outcome: Ongoing (interventions implemented as appropriate)    17 0552   Fall Risk  (Adult,Obstetrics,Pediatric)   Absence of  Fall/Drop other (see comments)  (pt remains pregnant)

## 2017-09-03 NOTE — ANESTHESIA PROCEDURE NOTES
Labor Epidural    Patient location during procedure: OB  Indication:at surgeon's request  Performed By  Anesthesiologist: MARSHA, CYNTHIA RAY  Preanesthetic Checklist  Completed: patient identified, site marked, surgical consent, pre-op evaluation, timeout performed, IV checked, risks and benefits discussed and monitors and equipment checked  Prep:  Pt Position:sitting  Sterile Tech:cap, gloves, mask and sterile barrier  Prep:chlorhexidine gluconate and isopropyl alcohol  Monitoring:blood pressure monitoring, continuous pulse oximetry and EKG  Epidural Block Procedure:  Approach:midline  Location:L4-L5  Needle Type:Tuohy  Needle Gauge:17  Aspiration:negative  Test Dose:negative  Post Assessment:  Dressing:occlusive dressing applied and secured with tape  Pt Tolerance:patient tolerated the procedure well with no apparent complications  Complications:no

## 2017-09-03 NOTE — PLAN OF CARE
Problem: Patient Care Overview (Adult)  Goal: Plan of Care Review  Outcome: Ongoing (interventions implemented as appropriate)    09/03/17 1739   Coping/Psychosocial Response Interventions   Plan Of Care Reviewed With patient;significant other;mother   Patient Care Overview   Progress improving   Outcome Evaluation   Outcome Summary/Follow up Plan AROM today, now on pitocin, small change in SVE, continue with position change and peanut ball       Goal: Adult Individualization and Mutuality  Outcome: Ongoing (interventions implemented as appropriate)    09/01/17 1859 09/02/17 0600 09/02/17 1820   Individualization   Patient Specific Preferences jordy, bottlefeeding, epidural, FOB to cut cord.  --  --    Patient Specific Goals --  Vaginal delivery --    Patient Specific Interventions --  --  epidural   Mutuality/Individual Preferences   What Anxieties, Fears or Concerns Do You Have About Your Health or Care? --  Does not want a c/s --    What Questions Do You Have About Your Health or Care? --  --  --      09/03/17 1739   Individualization   Patient Specific Preferences --    Patient Specific Goals --    Patient Specific Interventions --    Mutuality/Individual Preferences   What Anxieties, Fears or Concerns Do You Have About Your Health or Care? --    What Questions Do You Have About Your Health or Care? questions about plan, pitocin, epidural       Goal: Discharge Needs Assessment  Outcome: Ongoing (interventions implemented as appropriate)    09/03/17 1739   Discharge Needs Assessment   Concerns To Be Addressed no discharge needs identified         Problem: Labor (Cervical Ripen, Induct, Augment) (Adult,Obstetrics,Pediatric)  Goal: Signs and Symptoms of Listed Potential Problems Will be Absent or Manageable (Labor)  Outcome: Ongoing (interventions implemented as appropriate)    09/03/17 1739   Labor (Cervical Ripen, Induct, Augment)   Problems Assessed (Labor) all   Problems Present (Labor) none         Problem: Fall  Risk  (Adult,Obstetrics,Pediatric)  Goal: Identify Related Risk Factors and Signs and Symptoms  Outcome: Ongoing (interventions implemented as appropriate)  Goal: Absence of Maternal Fall  Outcome: Ongoing (interventions implemented as appropriate)

## 2017-09-04 PROCEDURE — 25010000003 CEFAZOLIN IN D5W 1 GM/50ML SOLUTION: Performed by: OBSTETRICS & GYNECOLOGY

## 2017-09-04 PROCEDURE — 59400 OBSTETRICAL CARE: CPT | Performed by: OBSTETRICS & GYNECOLOGY

## 2017-09-04 PROCEDURE — 25810000003 DEXTROSE-NACL PER 500 ML: Performed by: OBSTETRICS & GYNECOLOGY

## 2017-09-04 PROCEDURE — 10H07YZ INSERTION OF OTHER DEVICE INTO PRODUCTS OF CONCEPTION, VIA NATURAL OR ARTIFICIAL OPENING: ICD-10-PCS | Performed by: OBSTETRICS & GYNECOLOGY

## 2017-09-04 RX ORDER — MISOPROSTOL 200 UG/1
TABLET ORAL
Status: DISPENSED
Start: 2017-09-04 | End: 2017-09-04

## 2017-09-04 RX ORDER — OXYTOCIN/RINGER'S LACTATE 10/500ML
999 PLASTIC BAG, INJECTION (ML) INTRAVENOUS ONCE
Status: COMPLETED | OUTPATIENT
Start: 2017-09-04 | End: 2017-09-04

## 2017-09-04 RX ORDER — DOCUSATE SODIUM 100 MG/1
100 CAPSULE, LIQUID FILLED ORAL 2 TIMES DAILY
Status: DISCONTINUED | OUTPATIENT
Start: 2017-09-04 | End: 2017-09-06 | Stop reason: HOSPADM

## 2017-09-04 RX ORDER — ERYTHROMYCIN 5 MG/G
OINTMENT OPHTHALMIC
Status: DISPENSED
Start: 2017-09-04 | End: 2017-09-04

## 2017-09-04 RX ORDER — BISACODYL 10 MG
10 SUPPOSITORY, RECTAL RECTAL DAILY PRN
Status: DISCONTINUED | OUTPATIENT
Start: 2017-09-05 | End: 2017-09-06 | Stop reason: HOSPADM

## 2017-09-04 RX ORDER — OXYCODONE HYDROCHLORIDE AND ACETAMINOPHEN 5; 325 MG/1; MG/1
1 TABLET ORAL EVERY 4 HOURS PRN
Status: DISCONTINUED | OUTPATIENT
Start: 2017-09-04 | End: 2017-09-06 | Stop reason: HOSPADM

## 2017-09-04 RX ORDER — MISOPROSTOL 200 UG/1
800 TABLET ORAL ONCE AS NEEDED
Status: DISCONTINUED | OUTPATIENT
Start: 2017-09-04 | End: 2017-09-06 | Stop reason: HOSPADM

## 2017-09-04 RX ORDER — OXYCODONE HYDROCHLORIDE AND ACETAMINOPHEN 5; 325 MG/1; MG/1
2 TABLET ORAL EVERY 4 HOURS PRN
Status: DISCONTINUED | OUTPATIENT
Start: 2017-09-04 | End: 2017-09-06 | Stop reason: HOSPADM

## 2017-09-04 RX ORDER — METHYLERGONOVINE MALEATE 0.2 MG/ML
200 INJECTION INTRAVENOUS ONCE AS NEEDED
Status: DISCONTINUED | OUTPATIENT
Start: 2017-09-04 | End: 2017-09-06 | Stop reason: HOSPADM

## 2017-09-04 RX ORDER — ACETAMINOPHEN 325 MG/1
650 TABLET ORAL EVERY 4 HOURS PRN
Status: DISCONTINUED | OUTPATIENT
Start: 2017-09-04 | End: 2017-09-06 | Stop reason: HOSPADM

## 2017-09-04 RX ORDER — PHYTONADIONE 1 MG/.5ML
INJECTION, EMULSION INTRAMUSCULAR; INTRAVENOUS; SUBCUTANEOUS
Status: DISPENSED
Start: 2017-09-04 | End: 2017-09-04

## 2017-09-04 RX ORDER — LIDOCAINE HYDROCHLORIDE AND EPINEPHRINE 15; 5 MG/ML; UG/ML
INJECTION, SOLUTION EPIDURAL AS NEEDED
Status: DISCONTINUED | OUTPATIENT
Start: 2017-09-03 | End: 2017-09-04 | Stop reason: SURG

## 2017-09-04 RX ORDER — IBUPROFEN 600 MG/1
600 TABLET ORAL EVERY 8 HOURS PRN
Status: DISCONTINUED | OUTPATIENT
Start: 2017-09-04 | End: 2017-09-06 | Stop reason: HOSPADM

## 2017-09-04 RX ORDER — PROMETHAZINE HYDROCHLORIDE 12.5 MG/1
12.5 TABLET ORAL EVERY 4 HOURS PRN
Status: DISCONTINUED | OUTPATIENT
Start: 2017-09-04 | End: 2017-09-06 | Stop reason: HOSPADM

## 2017-09-04 RX ORDER — METHYLERGONOVINE MALEATE 0.2 MG/ML
200 INJECTION INTRAVENOUS AS NEEDED
Status: DISCONTINUED | OUTPATIENT
Start: 2017-09-04 | End: 2017-09-04 | Stop reason: HOSPADM

## 2017-09-04 RX ORDER — OXYTOCIN/RINGER'S LACTATE 10/500ML
125 PLASTIC BAG, INJECTION (ML) INTRAVENOUS CONTINUOUS
Status: DISCONTINUED | OUTPATIENT
Start: 2017-09-04 | End: 2017-09-04

## 2017-09-04 RX ORDER — ONDANSETRON 4 MG/1
4 TABLET, FILM COATED ORAL EVERY 8 HOURS PRN
Status: DISCONTINUED | OUTPATIENT
Start: 2017-09-04 | End: 2017-09-06 | Stop reason: HOSPADM

## 2017-09-04 RX ORDER — ZOLPIDEM TARTRATE 5 MG/1
5 TABLET ORAL NIGHTLY PRN
Status: DISCONTINUED | OUTPATIENT
Start: 2017-09-04 | End: 2017-09-06 | Stop reason: HOSPADM

## 2017-09-04 RX ADMIN — Medication 999 ML/HR: at 06:25

## 2017-09-04 RX ADMIN — DOCUSATE SODIUM 100 MG: 100 CAPSULE, LIQUID FILLED ORAL at 19:26

## 2017-09-04 RX ADMIN — Medication 999 ML/HR: at 06:03

## 2017-09-04 RX ADMIN — DEXTROSE AND SODIUM CHLORIDE 125 ML/HR: 5; 900 INJECTION, SOLUTION INTRAVENOUS at 02:44

## 2017-09-04 RX ADMIN — Medication 12 ML/HR: at 05:24

## 2017-09-04 RX ADMIN — Medication 125 ML/HR: at 06:56

## 2017-09-04 RX ADMIN — IBUPROFEN 600 MG: 600 TABLET ORAL at 19:26

## 2017-09-04 RX ADMIN — IBUPROFEN 600 MG: 600 TABLET ORAL at 10:32

## 2017-09-04 RX ADMIN — Medication 26 MILLI-UNITS/MIN: at 03:25

## 2017-09-04 NOTE — PLAN OF CARE
Problem: Patient Care Overview (Adult)  Goal: Plan of Care Review  Outcome: Ongoing (interventions implemented as appropriate)    17 0525   Coping/Psychosocial Response Interventions   Plan Of Care Reviewed With patient   Patient Care Overview   Progress progress toward functional goals as expected   Outcome Evaluation   Outcome Summary/Follow up Plan patient progressing well now through labor, remains comfortable with epidural. 3rd bag of Pitocin hanging for IOL.       Goal: Adult Individualization and Mutuality  Outcome: Ongoing (interventions implemented as appropriate)    Problem: Labor (Cervical Ripen, Induct, Augment) (Adult,Obstetrics,Pediatric)  Goal: Signs and Symptoms of Listed Potential Problems Will be Absent or Manageable (Labor)  Outcome: Ongoing (interventions implemented as appropriate)    Problem: Fall Risk  (Adult,Obstetrics,Pediatric)  Goal: Identify Related Risk Factors and Signs and Symptoms  Outcome: Ongoing (interventions implemented as appropriate)  Goal: Absence of Maternal Fall  Outcome: Ongoing (interventions implemented as appropriate)  Goal: Absence of  Fall/Drop  Outcome: Ongoing (interventions implemented as appropriate)

## 2017-09-04 NOTE — PLAN OF CARE
Problem: Labor (Cervical Ripen, Induct, Augment) (Adult,Obstetrics,Pediatric)  Goal: Signs and Symptoms of Listed Potential Problems Will be Absent or Manageable (Labor)  Outcome: Outcome(s) achieved Date Met:  09/04/17 09/04/17 0618   Labor (Cervical Ripen, Induct, Augment)   Problems Assessed (Labor) all   Problems Present (Labor) none         Problem: Postpartum, Vaginal Delivery (Adult)  Goal: Signs and Symptoms of Listed Potential Problems Will be Absent or Manageable (Postpartum, Vaginal Delivery)  Outcome: Ongoing (interventions implemented as appropriate)

## 2017-09-04 NOTE — PLAN OF CARE
Problem: Patient Care Overview (Adult)  Goal: Plan of Care Review  Outcome: Ongoing (interventions implemented as appropriate)    17   Coping/Psychosocial Response Interventions   Plan Of Care Reviewed With patient;spouse   Patient Care Overview   Progress improving       Goal: Adult Individualization and Mutuality    17 1859 17 0600 17 1739   Individualization   Patient Specific Preferences jordy, bottlefeeding, epidural, FOB to cut cord.  --  --    Patient Specific Goals --  Vaginal delivery --    Mutuality/Individual Preferences   What Questions Do You Have About Your Health or Care? --  --  questions about plan, pitocin, epidural   What Information Would Help Us Give You More Personalized Care? --  FOB --          Problem: Fall Risk  (Adult,Obstetrics,Pediatric)  Goal: Identify Related Risk Factors and Signs and Symptoms  Outcome: Ongoing (interventions implemented as appropriate)    17   Fall Risk    Fall Risk: Related Risk Factors balance change;other (see comments)    Fall Risk: Signs and Symptoms presence of fall risk factors       Goal: Absence of Maternal Fall    17   Fall Risk  (Adult,Obstetrics,Pediatric)   Absence of Maternal Fall achieves outcome       Goal: Absence of [place] Fall/Drop  Outcome: Ongoing (interventions implemented as appropriate)    17   Fall Risk  (Adult,Obstetrics,Pediatric)   Absence of  Fall/Drop achieves outcome         Problem: Postpartum, Vaginal Delivery (Adult)  Goal: Signs and Symptoms of Listed Potential Problems Will be Absent or Manageable (Postpartum, Vaginal Delivery)  Outcome: Ongoing (interventions implemented as appropriate)    17   Postpartum, Vaginal Delivery   Problems Assessed (Postpartum Vaginal Delivery) all   Problems Present (Postpartum Vaginal Delivery) none

## 2017-09-04 NOTE — L&D DELIVERY NOTE
[facility]  Vaginal Delivery Note    Delivery     Diagnosis:     IUP 37 4/7 weeks, IUGR  Delivery: Vaginal, Spontaneous Delivery     YOB: 2016    Time of Birth: 6:00 AM      Anesthesia: Epidural     Delivering clinician: Yue Saleem    Forceps?   No   Vacuum? No    Shoulder dystocia present: No        Delivery narrative:  The patient is a 21 yo  who presented for induction secondary to IUGR.  She underwent cervadil ripening with pitocin and made no cervical change.  She then repeated a cervadil overnight and pitocin again with amniotomy performed at 1-2 cm with clear fluid.  She received and epidural.  Initially progress was slow.  IUPC was placed at 3 cm.  She then hit active phase and had a normal progression.  FHTs were reassuring throughout the labor and delivery course.  She was started on an antibiotic for prolonged rupture and low grade fefer.  She became complete in the morning of .  She  Pushed over ojne contractionfor a spontaneous vaginal delivery. The infant had vigorous tone and cry after delivery.  He was bulb suctioned and cord was allowed to continue pulsing for about 1 minute before being clamped and cut.  He  was handed off to the waiting staff. Gases were not sent. The placenta did follow spontaneously. The uterus was explored with no retained products.  Perineum was intact.   cc.    Infant    Findings: male infant    Infant observations: Weight: 4 lb 15 oz  Length: 18 inches  Observations/Comments:         Apgars:   8 @ 1 minute /      9 @ 5 minutes   Infant Name: Cas     Placenta, Cord, and Fluid    Placenta delivered  Expressed  at    6:03 AM     Cord: 3 vessels  present.   Nuchal Cord?  no   Cord blood obtained: Yes    Cord gases obtained:  No    Cord gas results: Not sent         Repair    Episiotomy: No   Lacerations: No   Estimated Blood Loss: 200  mls.   Suture used for repair: none       Complications  none    Disposition  Mother to Mother  Baby/Postpartum  in stable condition currently.  Baby to NBN  in stable condition currently.      Yue Saleem MD  09/04/17  6:17 AM

## 2017-09-05 LAB
BASOPHILS # BLD AUTO: 0.02 10*3/MM3 (ref 0–0.2)
BASOPHILS NFR BLD AUTO: 0.1 % (ref 0–1.5)
DEPRECATED RDW RBC AUTO: 45.5 FL (ref 37–54)
EOSINOPHIL # BLD AUTO: 0.3 10*3/MM3 (ref 0–0.7)
EOSINOPHIL NFR BLD AUTO: 2.2 % (ref 0.3–6.2)
ERYTHROCYTE [DISTWIDTH] IN BLOOD BY AUTOMATED COUNT: 14.3 % (ref 11.7–13)
HCT VFR BLD AUTO: 29.1 % (ref 35.6–45.5)
HGB BLD-MCNC: 9.7 G/DL (ref 11.9–15.5)
IMM GRANULOCYTES # BLD: 0.09 10*3/MM3 (ref 0–0.03)
IMM GRANULOCYTES NFR BLD: 0.7 % (ref 0–0.5)
LYMPHOCYTES # BLD AUTO: 3.19 10*3/MM3 (ref 0.9–4.8)
LYMPHOCYTES NFR BLD AUTO: 23.4 % (ref 19.6–45.3)
MCH RBC QN AUTO: 29.1 PG (ref 26.9–32)
MCHC RBC AUTO-ENTMCNC: 33.3 G/DL (ref 32.4–36.3)
MCV RBC AUTO: 87.4 FL (ref 80.5–98.2)
MONOCYTES # BLD AUTO: 1.19 10*3/MM3 (ref 0.2–1.2)
MONOCYTES NFR BLD AUTO: 8.7 % (ref 5–12)
NEUTROPHILS # BLD AUTO: 8.82 10*3/MM3 (ref 1.9–8.1)
NEUTROPHILS NFR BLD AUTO: 64.9 % (ref 42.7–76)
PLATELET # BLD AUTO: 191 10*3/MM3 (ref 140–500)
PMV BLD AUTO: 10 FL (ref 6–12)
RBC # BLD AUTO: 3.33 10*6/MM3 (ref 3.9–5.2)
WBC NRBC COR # BLD: 13.61 10*3/MM3 (ref 4.5–10.7)

## 2017-09-05 PROCEDURE — 85025 COMPLETE CBC W/AUTO DIFF WBC: CPT | Performed by: OBSTETRICS & GYNECOLOGY

## 2017-09-05 PROCEDURE — 99024 POSTOP FOLLOW-UP VISIT: CPT | Performed by: OBSTETRICS & GYNECOLOGY

## 2017-09-05 RX ADMIN — DOCUSATE SODIUM 100 MG: 100 CAPSULE, LIQUID FILLED ORAL at 08:31

## 2017-09-05 RX ADMIN — OXYCODONE HYDROCHLORIDE AND ACETAMINOPHEN 1 TABLET: 5; 325 TABLET ORAL at 17:53

## 2017-09-05 RX ADMIN — DOCUSATE SODIUM 100 MG: 100 CAPSULE, LIQUID FILLED ORAL at 17:53

## 2017-09-05 RX ADMIN — IBUPROFEN 600 MG: 600 TABLET ORAL at 05:57

## 2017-09-05 RX ADMIN — OXYCODONE HYDROCHLORIDE AND ACETAMINOPHEN 1 TABLET: 5; 325 TABLET ORAL at 08:31

## 2017-09-05 RX ADMIN — IBUPROFEN 600 MG: 600 TABLET ORAL at 15:04

## 2017-09-05 NOTE — PROGRESS NOTES
"Postpartum Vaginal Delivery Note PPD#1    CC:  PPD 1    Assessment:   Pt doing well. Pain well controlled. Lochia normal. Ambulating well. Tolerating regular diet. Voiding without difficulty. No complaints.  Desires infant circ, RBA reviewed.    Vitals:   /67 (BP Location: Right arm, Patient Position: Sitting)  Pulse 60  Temp 98.1 °F (36.7 °C) (Oral)   Resp 16  Ht 66\" (167.6 cm)  Wt 134 lb 3.2 oz (60.9 kg)  LMP 12/15/2016  SpO2 99%  Breastfeeding? Unknown  BMI 21.66 kg/m2    ROS:  No N/V, no dysuria, no leg pain, no fever or chills    Exam:   Neck:  No LAD , no TM  Lungs:  Non labored breathing  Heart:  RRR  Gen: NAD, cooperative, conversive  Abd: Soft, nondistended, fundus is firm below umbillicus, approp tender  Ext: Nontender bilaterally, trace edema bilateral    Labs:  Lab Results (last 24 hours)     Procedure Component Value Units Date/Time    CBC & Differential [[id number]] Collected:  09/05/17 0621    Specimen:  Blood Updated:  09/05/17 0700    Narrative:       The following orders were created for panel order CBC & Differential.  Procedure                               Abnormality         Status                     ---------                               -----------         ------                     Scan Slide[[id number]]                                                                  CBC Auto Differential[[id number]]        Abnormal            Final result                 Please view results for these tests on the individual orders.    CBC Auto Differential [[id number]]  (Abnormal) Collected:  09/05/17 0621    Specimen:  Blood Updated:  09/05/17 0700     WBC 13.61 (H) 10*3/mm3      RBC 3.33 (L) 10*6/mm3      Hemoglobin 9.7 (L) g/dL      Hematocrit 29.1 (L) %      MCV 87.4 fL      MCH 29.1 pg      MCHC 33.3 g/dL      RDW 14.3 (H) %      RDW-SD 45.5 fl      MPV 10.0 fL      Platelets 191 10*3/mm3      Neutrophil % 64.9 %      Lymphocyte % 23.4 %      Monocyte % 8.7 %      Eosinophil % 2.2 %      " Basophil % 0.1 %      Immature Grans % 0.7 (H) %      Neutrophils, Absolute 8.82 (H) 10*3/mm3      Lymphocytes, Absolute 3.19 10*3/mm3      Monocytes, Absolute 1.19 10*3/mm3      Eosinophils, Absolute 0.30 10*3/mm3      Basophils, Absolute 0.02 10*3/mm3      Immature Grans, Absolute 0.09 (H) 10*3/mm3           Assessment: Annabella Padgett is a 22 y.o. female  s/p   Patient Active Problem List   Diagnosis   • History of substance use   • Benzodiazepine abuse   • Marijuana use   • Tobacco use complicating pregnancy   • Supervision of high risk pregnancy, antepartum   • Intrauterine growth restriction (IUGR) affecting care of mother, second trimester, single gestation   • Anemia affecting pregnancy   • IUGR (intrauterine growth restriction) affecting care of mother       Plan:  1. Routine Postpartum care  2. Ambulation encouraged.  3.  circ today         Signed By:  Yue Saleem MD       2017 9:48 AM

## 2017-09-05 NOTE — PLAN OF CARE
Problem: Patient Care Overview (Adult)  Goal: Plan of Care Review  Outcome: Ongoing (interventions implemented as appropriate)    09/04/17 2243   Coping/Psychosocial Response Interventions   Plan Of Care Reviewed With patient   Patient Care Overview   Progress improving   Outcome Evaluation   Outcome Summary/Follow up Plan pain well controlled on po pain meds, avss         09/04/17 2243   Coping/Psychosocial Response Interventions   Plan Of Care Reviewed With patient   Patient Care Overview   Progress improving   Outcome Evaluation   Outcome Summary/Follow up Plan pain well controlled on po pain meds, avss       Goal: Adult Individualization and Mutuality  Outcome: Ongoing (interventions implemented as appropriate)  Goal: Discharge Needs Assessment  Outcome: Ongoing (interventions implemented as appropriate)    Problem: Postpartum, Vaginal Delivery (Adult)  Goal: Signs and Symptoms of Listed Potential Problems Will be Absent or Manageable (Postpartum, Vaginal Delivery)  Outcome: Ongoing (interventions implemented as appropriate)

## 2017-09-05 NOTE — PROGRESS NOTES
Continued Stay Note  [facility]     Patient Name: Annabella Padgett  MRN: [id number]  Today's Date: 9/5/2017    Admit Date: 9/1/2017          Discharge Plan       09/05/17 1837    Case Management/Social Work Plan    Plan Home with infant.     Additional Comments Mother;  Annabella Padgett, MRN:  [id number]; Infant:  Cas Feliz, MRN:  [id number]. Consult for mother having positive drug screens (THC and Benzo) during pregnancy and had a suicide attempt at age 18.  Urine drug screens were negative on both the mother and the infant.  Spoke to the mother's RN Ghada who stated that she did not have any concerns about the mother, meconium will be collected and the mother's Aynor scores were low and did not require a consult for any resources for post partum depression.  Spoke to Holly with the [facility] hotline who stated that the patient does not have a current or active case.  Met with the patient, her mother and the father of the baby at bedside per the mother's permission all remained in the room.  The Father of the infant is Stiven Feliz and the mother resides with him, her 3 year old daughter, her new son and the paternal grandfather who all assist her with the care of her children.  The infant's pediatrician is going to be Dr. carrero, the mother has a car seat, clothes and crib, infant has been added to the mother's [employer] insurance, will be bottle fed and the mother will call [facility] tomorrow to have the infant added.  The mother denies and refuses any services or resources for substance abuse or mental health treatment.  The mother's RN was informed to consult [facility] if needed to address those issues.  MARII Nava              Discharge Codes     None            Janet Dodge

## 2017-09-06 ENCOUNTER — APPOINTMENT (OUTPATIENT)
Dept: CARDIOLOGY | Facility: HOSPITAL | Age: 22
End: 2017-09-06
Attending: OBSTETRICS & GYNECOLOGY

## 2017-09-06 VITALS
RESPIRATION RATE: 16 BRPM | OXYGEN SATURATION: 99 % | SYSTOLIC BLOOD PRESSURE: 118 MMHG | BODY MASS INDEX: 21.57 KG/M2 | TEMPERATURE: 97.9 F | WEIGHT: 134.2 LBS | HEART RATE: 79 BPM | DIASTOLIC BLOOD PRESSURE: 71 MMHG | HEIGHT: 66 IN

## 2017-09-06 LAB

## 2017-09-06 PROCEDURE — 93970 EXTREMITY STUDY: CPT

## 2017-09-06 PROCEDURE — 99024 POSTOP FOLLOW-UP VISIT: CPT | Performed by: OBSTETRICS & GYNECOLOGY

## 2017-09-06 RX ORDER — IBUPROFEN 600 MG/1
600 TABLET ORAL EVERY 8 HOURS PRN
Qty: 30 TABLET | Refills: 3 | Status: SHIPPED | OUTPATIENT
Start: 2017-09-06

## 2017-09-06 RX ORDER — OXYCODONE HYDROCHLORIDE AND ACETAMINOPHEN 5; 325 MG/1; MG/1
1 TABLET ORAL EVERY 4 HOURS PRN
Qty: 20 TABLET | Refills: 0 | Status: SHIPPED | OUTPATIENT
Start: 2017-09-06 | End: 2017-09-14

## 2017-09-06 RX ADMIN — OXYCODONE HYDROCHLORIDE AND ACETAMINOPHEN 1 TABLET: 5; 325 TABLET ORAL at 00:10

## 2017-09-06 RX ADMIN — DOCUSATE SODIUM 100 MG: 100 CAPSULE, LIQUID FILLED ORAL at 08:49

## 2017-09-06 RX ADMIN — IBUPROFEN 600 MG: 600 TABLET ORAL at 08:49

## 2017-09-06 RX ADMIN — IBUPROFEN 600 MG: 600 TABLET ORAL at 00:10

## 2017-09-06 RX ADMIN — OXYCODONE HYDROCHLORIDE AND ACETAMINOPHEN 1 TABLET: 5; 325 TABLET ORAL at 10:37

## 2017-09-06 NOTE — PLAN OF CARE
Problem: Patient Care Overview (Adult)  Goal: Plan of Care Review  Outcome: Ongoing (interventions implemented as appropriate)    17 0330   Coping/Psychosocial Response Interventions   Plan Of Care Reviewed With patient;significant other   Patient Care Overview   Progress improving   Outcome Evaluation   Outcome Summary/Follow up Plan v/s stable, pain controlled with PO pain meds, Right calf pain overnight, doppler scheduled for am, Infant admitted to NICU last night, plan D/C today, would like to board       Goal: Adult Individualization and Mutuality  Outcome: Ongoing (interventions implemented as appropriate)  Goal: Discharge Needs Assessment  Outcome: Ongoing (interventions implemented as appropriate)    Problem: Fall Risk  (Adult,Obstetrics,Pediatric)  Goal: Identify Related Risk Factors and Signs and Symptoms  Outcome: Ongoing (interventions implemented as appropriate)  Goal: Absence of Maternal Fall  Outcome: Ongoing (interventions implemented as appropriate)  Goal: Absence of [place] Fall/Drop  Outcome: Ongoing (interventions implemented as appropriate)    Problem: Postpartum, Vaginal Delivery (Adult)  Goal: Signs and Symptoms of Listed Potential Problems Will be Absent or Manageable (Postpartum, Vaginal Delivery)  Outcome: Ongoing (interventions implemented as appropriate)

## 2017-09-06 NOTE — DISCHARGE SUMMARY
[facility]  Vaginal delivery Discharge Summary      Date of Admission: 2017    Date of Discharge:  2017    Patient: Annabella Padgett      MR#:[id number]    Surgeon/OB: Yue Saleem     Discharge Diagnosis:   Vaginal Delivery at 37w4d, uncomplicated recovery  Infant remains in [facility] for fever work-up     Procedures:  Vaginal, Spontaneous Delivery     2017    6:00 AM      Anesthesia:  Epidural     Presenting Problem/History of Present Illness  IUGR (intrauterine growth restriction) affecting care of mother [O36.5990]     Patient Active Problem List   Diagnosis   • History of substance use   • Benzodiazepine abuse   • Marijuana use   • Tobacco use complicating pregnancy   • Supervision of high risk pregnancy, antepartum   • Intrauterine growth restriction (IUGR) affecting care of mother, second trimester, single gestation   • Anemia affecting pregnancy   • IUGR (intrauterine growth restriction) affecting care of mother       Hospital Course  Patient is a 22 y.o. female  at 37w4d status post vaginal delivery.    Uneventful recovery.  Patient is ambulating, tolerating a regular diet.  Perineum is intact.    Infant:   male  fetus 4 lb 15.4 oz (2.25 kg)  with Apgar scores of 8  , 9   at five minutes.    Condition on Discharge:  Stable    Vital Signs  Temp:  [98.2 °F (36.8 °C)] 98.2 °F (36.8 °C)  Heart Rate:  [54-55] 54  Resp:  [16] 16  BP: (104-131)/(67-83) 104/67    Lab Results   Component Value Date    WBC 13.61 (H) 2017    HGB 9.7 (L) 2017    HCT 29.1 (L) 2017    MCV 87.4 2017     2017       Discharge Disposition  Home or Self Care    Discharge Medications   Annabella Padgett   Home Medication Instructions SHERIE:[id number]    Printed on:17 0732   Medication Information                      ferrous sulfate (FEOSOL) 325 (65 FE) MG tablet  Take 1 tablet by mouth 2 (Two) Times a Day.             ibuprofen (ADVIL,MOTRIN)  600 MG tablet  Take 1 tablet by mouth Every 8 (Eight) Hours As Needed for Mild Pain .             oxyCODONE-acetaminophen (PERCOCET) 5-325 MG per tablet  Take 1 tablet by mouth Every 4 (Four) Hours As Needed for Moderate Pain  for up to 8 days.             Prenatal Vit-Fe Fumarate-FA (PNV PRENATAL PLUS MULTIVITAMIN) 27-1 MG tablet  TAKE 1 TABLET EVERY DAY                 Discharge Diet: Regular    Activity at Discharge:   Activity Instructions     Discharge Activity Restrictions       1) No driving for 1 week and no longer taking narcotics.   2) Return to school / work in 6 weeks   3) May shower ad jean-paul  4) Do not lift / push / pull more then 25 lbs.  5) Vaginal/pelvic rest for 6 weeks:  No intercourse, tampons                 Follow-up Appointments  No future appointments.  Additional Instructions for the Follow-ups that You Need to Schedule     Call MD for problems / concerns.    As directed    Heavy bleeding:  Greater than a pad an hour for more than 2 hours  Fever greater than 101.3   Redness of the episiotomy or vaginal laceration and/or severe pain increased from discharge pain.         Follow-Up    As directed    For uncomplicated Vaginal delivery, follow-up with in 4 weeks unless you are instructed otherwise by rounding physician.                    Prenatal labs/vax: A pos RI VI tdap given       Torrey Strong MD  09/06/17  7:32 AM

## 2017-09-06 NOTE — PROGRESS NOTES
[facility]     2017    Name:Annabella Padgett   MR#:[id number]    Vaginal Delivery Progress Note    HD#5    Subjective   Postpartum Day 2: 22 y.o. yo Female  delivered at 37w4d  delivered a male  infant.     The patient feels well.  Her pain is controlled.    She ambulating well.  Patient describes her bleeding as thin lochia.    Breastfeeding: without difficulty.     Patient Active Problem List   Diagnosis   • History of substance use   • Benzodiazepine abuse   • Marijuana use   • Tobacco use complicating pregnancy   • Supervision of high risk pregnancy, antepartum   • Intrauterine growth restriction (IUGR) affecting care of mother, second trimester, single gestation   • Anemia affecting pregnancy   • IUGR (intrauterine growth restriction) affecting care of mother       Objective   Vital Signs Range for the last 24 hours  Temp: Temp:  [98.1 °F (36.7 °C)-98.2 °F (36.8 °C)] 98.2 °F (36.8 °C) Temp src: Oral   BP: BP: (104-131)/(67-83) 104/67        Pulse: Heart Rate:  [54-60] 54  RR: Resp:  [16] 16  Weight: 134 lb 3.2 oz (60.9 kg)  BMI:  Body mass index is 21.66 kg/(m^2).      Lab Results   Component Value Date    WBC 13.61 (H) 2017    HGB 9.7 (L) 2017    HCT 29.1 (L) 2017    MCV 87.4 2017     2017       Physical Exam  General:  no acute distresss.  Abdomen: abdomen is soft without significant tenderness, masses, organomegaly or guarding. Fundus: Firm with scant lochia  Extremities: no cyanosis, and 1+ edema, no CT    Perineum:  Intact    Assessment/Plan   1.  PPD# 2      Plan:  Routine Postpartum care,  Home today.      Torrey Strong MD  2017 6:55 AM

## 2017-09-07 NOTE — PROGRESS NOTES
Continued Stay Note  [facility]     Patient Name: Annabella Padgett  MRN: [id number]  Today's Date: 9/7/2017    Admit Date: 9/1/2017          Discharge Plan       09/07/17 1828    Case Management/Social Work Plan    Additional Comments The infant's mother was d/c and the infant was transferred to the NICU for temperature instability.  Please see infant's chart for additional notes or information. CCP will follow to assist as needed and for meconium results.  MARII Nava              Discharge Codes     None        Expected Discharge Date and Time     Expected Discharge Date Expected Discharge Time    Sep 6, 2017             Janet Dodge

## 2017-09-13 NOTE — PROGRESS NOTES
Continued Stay Note  [facility]     Patient Name: Annabella Padgett  MRN: [id number]  Today's Date: 9/13/2017    Admit Date: 9/1/2017          Discharge Plan       09/13/17 1048    Case Management/Social Work Plan    Plan Home with infant.     Additional Comments Spoke to Sayra with the CPS hotline and report was made as the infant's meconium resulted positive for Cannabinoids.  CPS ID intake # provided is [id number].   MARII Nava              Discharge Codes     None        Expected Discharge Date and Time     Expected Discharge Date Expected Discharge Time    Sep 6, 2017             Janet Dodge

## 2017-09-15 NOTE — PROGRESS NOTES
Continued Stay Note  [facility]     Patient Name: Annabella Padgett  MRN: [id number]  Today's Date: 9/15/2017    Admit Date: 9/1/2017          Discharge Plan       09/15/17 1210    Case Management/Social Work Plan    Additional Comments Spoke to Ms. Beach with the [facility] hotline who stated that the case was assigned to the team of supervisor Yue Worrell [phone].  Unable to leave a voicemail for Yue and email was sent to Yue.  Reply email from Yue states that she is out of the office and will not return for several weeks.  Spoke to Sayra with the [facility] hotline who stated that Yesenia Cardoso [phone] ext. [phone] is covering cases for Yue Worrell.  Spoke to Yesenia Cardoso and she was informed that the mother and infant have been discharged from [facility] and CCP can be contacted with any additional questions or concerns.  MARII Nava              Discharge Codes       09/15/17 1210    Discharge Codes    Discharge Codes 01  Discharge to home        Expected Discharge Date and Time     Expected Discharge Date Expected Discharge Time    Sep 6, 2017             Janet Dodge

## 2023-06-09 ENCOUNTER — TELEPHONE (OUTPATIENT)
Dept: OBSTETRICS AND GYNECOLOGY | Facility: CLINIC | Age: 28
End: 2023-06-09
Payer: COMMERCIAL
